# Patient Record
Sex: MALE | Race: WHITE | NOT HISPANIC OR LATINO | Employment: FULL TIME | URBAN - METROPOLITAN AREA
[De-identification: names, ages, dates, MRNs, and addresses within clinical notes are randomized per-mention and may not be internally consistent; named-entity substitution may affect disease eponyms.]

---

## 2019-10-02 ENCOUNTER — TELEPHONE (OUTPATIENT)
Dept: FAMILY MEDICINE | Facility: CLINIC | Age: 44
End: 2019-10-02

## 2019-10-02 NOTE — TELEPHONE ENCOUNTER
----- Message from Nita Sena sent at 10/2/2019  3:14 PM CDT -----  Contact: wife Thuy [phone]  Patient's spouse states she is returning your call. Please advise.

## 2019-11-01 ENCOUNTER — OFFICE VISIT (OUTPATIENT)
Dept: PRIMARY CARE CLINIC | Facility: CLINIC | Age: 44
End: 2019-11-01
Payer: COMMERCIAL

## 2019-11-01 VITALS
WEIGHT: 273.13 LBS | DIASTOLIC BLOOD PRESSURE: 80 MMHG | SYSTOLIC BLOOD PRESSURE: 120 MMHG | RESPIRATION RATE: 15 BRPM | BODY MASS INDEX: 36.2 KG/M2 | TEMPERATURE: 99 F | HEART RATE: 74 BPM | HEIGHT: 73 IN | OXYGEN SATURATION: 96 %

## 2019-11-01 DIAGNOSIS — Z23 NEED FOR VACCINATION: ICD-10-CM

## 2019-11-01 DIAGNOSIS — J30.9 ALLERGIC RHINITIS, UNSPECIFIED SEASONALITY, UNSPECIFIED TRIGGER: ICD-10-CM

## 2019-11-01 DIAGNOSIS — F33.0 MILD RECURRENT MAJOR DEPRESSION: ICD-10-CM

## 2019-11-01 DIAGNOSIS — E29.1 HYPOGONADISM MALE: ICD-10-CM

## 2019-11-01 DIAGNOSIS — Z00.00 ANNUAL PHYSICAL EXAM: Primary | ICD-10-CM

## 2019-11-01 PROCEDURE — 90471 TDAP VACCINE GREATER THAN OR EQUAL TO 7YO IM: ICD-10-PCS | Mod: S$GLB,,, | Performed by: INTERNAL MEDICINE

## 2019-11-01 PROCEDURE — 90715 TDAP VACCINE 7 YRS/> IM: CPT | Mod: S$GLB,,, | Performed by: INTERNAL MEDICINE

## 2019-11-01 PROCEDURE — 90715 TDAP VACCINE GREATER THAN OR EQUAL TO 7YO IM: ICD-10-PCS | Mod: S$GLB,,, | Performed by: INTERNAL MEDICINE

## 2019-11-01 PROCEDURE — 99999 PR PBB SHADOW E&M-EST. PATIENT-LVL IV: ICD-10-PCS | Mod: PBBFAC,,, | Performed by: INTERNAL MEDICINE

## 2019-11-01 PROCEDURE — 99999 PR PBB SHADOW E&M-EST. PATIENT-LVL IV: CPT | Mod: PBBFAC,,, | Performed by: INTERNAL MEDICINE

## 2019-11-01 PROCEDURE — 99386 PREV VISIT NEW AGE 40-64: CPT | Mod: 25,S$GLB,, | Performed by: INTERNAL MEDICINE

## 2019-11-01 PROCEDURE — 99386 PR PREVENTIVE VISIT,NEW,40-64: ICD-10-PCS | Mod: 25,S$GLB,, | Performed by: INTERNAL MEDICINE

## 2019-11-01 PROCEDURE — 90471 IMMUNIZATION ADMIN: CPT | Mod: S$GLB,,, | Performed by: INTERNAL MEDICINE

## 2019-11-01 RX ORDER — BUPROPION HYDROCHLORIDE 300 MG/1
1 TABLET ORAL DAILY
Refills: 1 | COMMUNITY
Start: 2019-09-13 | End: 2020-11-12 | Stop reason: SDUPTHER

## 2019-11-01 RX ORDER — FLUOXETINE HYDROCHLORIDE 20 MG/1
1 CAPSULE ORAL DAILY
COMMUNITY
End: 2020-07-27 | Stop reason: SDUPTHER

## 2019-11-01 NOTE — PROGRESS NOTES
[facility] Note    Chief Complaint      Chief Complaint   Patient presents with    Establish Care       History of Present Illness      Jacinto Alexander is a 44 y.o. male with chronic conditions of hypogonadism, depression who presents today for: establish care and annual preventative visit.  No recent PCP.    Hypogonadism: previously on topical testosterone. Was seeing Dr. Schofield, urology at the time.   Depression: Sees Dr. Denise.  On prozac and wellbutrin.  No side effects.  Control is satisfactory.  Diet: Prepares own food mostly.  Needs to limit fatty foods and carbs.  Focusing on portion control.  Has been eating worse since quitting smoking.  Drinks plenty water.    Exercise: Stays active.  Would like to bike more. Currently 1x/week.    Denies drinking and driving, drinking more than 4 drinks on occasion, drug use.       Past Medical History:  History reviewed. No pertinent past medical history.    Past Surgical History:   has a past surgical history that includes Cholecystectomy and Vasectomy.    Family History:  family history includes Heart disease in his father and mother; Lung cancer in his father; Stroke in his father.     Social History:  Social History     Tobacco Use    Smoking status: Former Smoker     Years: 19.00     Types: Cigarettes    Smokeless tobacco: Never Used   Substance Use Topics    Alcohol use: Not on file    Drug use: Not on file       Review of Systems   Constitutional: Negative for chills, fever and malaise/fatigue.   HENT: Negative for hearing loss.    Eyes: Negative for discharge.   Respiratory: Negative for shortness of breath and wheezing.    Cardiovascular: Negative for chest pain and palpitations.   Gastrointestinal: Negative for blood in stool, constipation, diarrhea, nausea and vomiting.   Genitourinary: Negative for hematuria and urgency.   Musculoskeletal: Negative for neck pain.   Skin: Negative for rash.   Neurological: Negative for weakness and  "headaches.   Endo/Heme/Allergies: Negative for polydipsia.        Medications:  Outpatient Encounter Medications as of 11/1/2019   Medication Sig Dispense Refill    buPROPion (WELLBUTRIN XL) 300 MG 24 hr tablet Take 1 tablet by mouth once daily.  1    FLUoxetine 20 MG capsule Take 1 capsule by mouth once daily.       No facility-administered encounter medications on file as of 11/1/2019.        Allergies:  Review of patient's allergies indicates:  No Known Allergies    Health Maintenance:  There is no immunization history for the selected administration types on file for this patient.   Health Maintenance   Topic Date Due    Lipid Panel  1973    TETANUS VACCINE  02/12/1991      Flu shot declines.  TdAP due.  Shingles vaccine due age 60.  Pneumonia vaccine due age 65.  Cscope due age 50.      Physical Exam      Vital Signs  Temp: 99.4 °F (37.4 °C)  Temp src: Oral  Pulse: 74  Resp: 15  SpO2: 96 %  BP: 120/80  BP Location: Left arm  Patient Position: Sitting  Pain Score: 0-No pain  Height and Weight  Height: 6' 1" (185.4 cm)  Weight: 123.9 kg (273 lb 2.4 oz)  BSA (Calculated - sq m): 2.53 sq meters  BMI (Calculated): 36.1  Weight in (lb) to have BMI = 25: 189.1]    Physical Exam   Constitutional: He appears well-developed and well-nourished.   HENT:   Head: Normocephalic and atraumatic.   Right Ear: External ear normal.   Left Ear: External ear normal.   Mouth/Throat: Oropharynx is clear and moist.   Eyes: Pupils are equal, round, and reactive to light. Conjunctivae and EOM are normal.   Cardiovascular: Normal rate, regular rhythm, normal heart sounds and intact distal pulses.   No murmur heard.  Pulmonary/Chest: Effort normal and breath sounds normal. He has no wheezes. He has no rales.   Abdominal: Soft. Bowel sounds are normal. He exhibits no distension and no abdominal bruit. There is no hepatosplenomegaly. There is no tenderness.   Vitals reviewed.       Laboratory:  CBC:      CMP:        Invalid " input(s): CREATININ  URINALYSIS:       LIPIDS:      TSH:      A1C:        Assessment/Plan     Jacinto Alexander is a 44 y.o.male with:    1. Annual physical exam  - CBC auto differential; Future  - Comprehensive metabolic panel; Future  - Lipid panel; Future  - TSH; Future  - T4, free; Future  - PSA, Screening; Future  - Testosterone, Total, Males; Future  - CBC auto differential  - Comprehensive metabolic panel  - Lipid panel  - TSH  - T4, free  - PSA, Screening  - Testosterone, Total, Males  Discussed diet and exercise, vaccines and cancer screening, risk factors.  Screening labs ordered.     2. Mild recurrent major depression  Continue current meds.  F/u with Dr. Denise   3. Hypogonadism male  - Testosterone, Total, Males; Future  - Testosterone, Total, Males  Will update labs and consider referral to endocrinology.    4. Need for vaccination  - Tdap Vaccine       Chronic conditions status updated as per HPI.  Other than changes above, cont current medications and maintain follow up with specialists.  Return to clinic in 12 months.    Ari Alanis MD  [facility]

## 2020-02-10 ENCOUNTER — TELEPHONE (OUTPATIENT)
Dept: FAMILY MEDICINE | Facility: CLINIC | Age: 45
End: 2020-02-10

## 2020-02-10 NOTE — TELEPHONE ENCOUNTER
----- Message from Parker Zamora sent at 2/10/2020  2:22 PM CST -----  Contact: Thuy Alexander ( wife)   Thuy Alexander ( wife) requesting call back in regards to getting orders for labs.       Call back number is [phone].       Thanks

## 2020-02-25 LAB
ALBUMIN SERPL-MCNC: 4.4 G/DL (ref 3.6–5.1)
ALBUMIN/GLOB SERPL: 1.8 (CALC) (ref 1–2.5)
ALP SERPL-CCNC: 85 U/L (ref 36–130)
ALT SERPL-CCNC: 17 U/L (ref 9–46)
AST SERPL-CCNC: 19 U/L (ref 10–40)
BASOPHILS # BLD AUTO: 79 CELLS/UL (ref 0–200)
BASOPHILS NFR BLD AUTO: 1.1 %
BILIRUB SERPL-MCNC: 1 MG/DL (ref 0.2–1.2)
BUN SERPL-MCNC: 11 MG/DL (ref 7–25)
BUN/CREAT SERPL: 8 (CALC) (ref 6–22)
CALCIUM SERPL-MCNC: 9.3 MG/DL (ref 8.6–10.3)
CHLORIDE SERPL-SCNC: 103 MMOL/L (ref 98–110)
CHOLEST SERPL-MCNC: 204 MG/DL
CHOLEST/HDLC SERPL: 6.8 (CALC)
CO2 SERPL-SCNC: 26 MMOL/L (ref 20–32)
CREAT SERPL-MCNC: 1.39 MG/DL (ref 0.6–1.35)
EOSINOPHIL # BLD AUTO: 281 CELLS/UL (ref 15–500)
EOSINOPHIL NFR BLD AUTO: 3.9 %
ERYTHROCYTE [DISTWIDTH] IN BLOOD BY AUTOMATED COUNT: 13.2 % (ref 11–15)
GFRSERPLBLD MDRD-ARVRAT: 60 ML/MIN/1.73M2
GLOBULIN SER CALC-MCNC: 2.5 G/DL (CALC) (ref 1.9–3.7)
GLUCOSE SERPL-MCNC: 90 MG/DL (ref 65–99)
HCT VFR BLD AUTO: 46.9 % (ref 38.5–50)
HDLC SERPL-MCNC: 30 MG/DL
HGB BLD-MCNC: 15.7 G/DL (ref 13.2–17.1)
LDLC SERPL CALC-MCNC: 132 MG/DL (CALC)
LYMPHOCYTES # BLD AUTO: 2981 CELLS/UL (ref 850–3900)
LYMPHOCYTES NFR BLD AUTO: 41.4 %
MCH RBC QN AUTO: 28.8 PG (ref 27–33)
MCHC RBC AUTO-ENTMCNC: 33.5 G/DL (ref 32–36)
MCV RBC AUTO: 86.1 FL (ref 80–100)
MONOCYTES # BLD AUTO: 461 CELLS/UL (ref 200–950)
MONOCYTES NFR BLD AUTO: 6.4 %
NEUTROPHILS # BLD AUTO: 3398 CELLS/UL (ref 1500–7800)
NEUTROPHILS NFR BLD AUTO: 47.2 %
NONHDLC SERPL-MCNC: 174 MG/DL (CALC)
PLATELET # BLD AUTO: 237 THOUSAND/UL (ref 140–400)
PMV BLD REES-ECKER: 9.5 FL (ref 7.5–12.5)
POTASSIUM SERPL-SCNC: 4 MMOL/L (ref 3.5–5.3)
PROT SERPL-MCNC: 6.9 G/DL (ref 6.1–8.1)
PSA SERPL-MCNC: 0.5 NG/ML
RBC # BLD AUTO: 5.45 MILLION/UL (ref 4.2–5.8)
SODIUM SERPL-SCNC: 140 MMOL/L (ref 135–146)
T4 FREE SERPL-MCNC: 1 NG/DL (ref 0.8–1.8)
TESTOST SERPL-MCNC: 210 NG/DL (ref 250–827)
TRIGL SERPL-MCNC: 275 MG/DL
TSH SERPL-ACNC: 3.06 MIU/L (ref 0.4–4.5)
WBC # BLD AUTO: 7.2 THOUSAND/UL (ref 3.8–10.8)

## 2020-03-02 DIAGNOSIS — E29.1 HYPOGONADISM MALE: Primary | ICD-10-CM

## 2020-03-02 NOTE — PROGRESS NOTES
Labs show borderline low testosterone.  Will send to endocrinology to see if restarting testosterone supplement is indicated.  Otherwise cholesterol slightly high.  Focus on low fat diet and exercise to keep controlled.  Other labs ok.

## 2020-03-10 ENCOUNTER — OFFICE VISIT (OUTPATIENT)
Dept: ENDOCRINOLOGY | Facility: CLINIC | Age: 45
End: 2020-03-10
Payer: COMMERCIAL

## 2020-03-10 VITALS
BODY MASS INDEX: 35.68 KG/M2 | WEIGHT: 269.19 LBS | DIASTOLIC BLOOD PRESSURE: 88 MMHG | HEART RATE: 88 BPM | HEIGHT: 73 IN | SYSTOLIC BLOOD PRESSURE: 116 MMHG

## 2020-03-10 DIAGNOSIS — E66.9 OBESITY (BMI 30.0-34.9): ICD-10-CM

## 2020-03-10 DIAGNOSIS — R06.83 HABITUAL SNORING: ICD-10-CM

## 2020-03-10 DIAGNOSIS — E29.1 HYPOGONADISM MALE: Primary | ICD-10-CM

## 2020-03-10 PROBLEM — E66.811 OBESITY (BMI 30.0-34.9): Status: ACTIVE | Noted: 2020-03-10

## 2020-03-10 PROCEDURE — 3008F PR BODY MASS INDEX (BMI) DOCUMENTED: ICD-10-PCS | Mod: CPTII,S$GLB,, | Performed by: STUDENT IN AN ORGANIZED HEALTH CARE EDUCATION/TRAINING PROGRAM

## 2020-03-10 PROCEDURE — 99204 OFFICE O/P NEW MOD 45 MIN: CPT | Mod: S$GLB,,, | Performed by: STUDENT IN AN ORGANIZED HEALTH CARE EDUCATION/TRAINING PROGRAM

## 2020-03-10 PROCEDURE — 3008F BODY MASS INDEX DOCD: CPT | Mod: CPTII,S$GLB,, | Performed by: STUDENT IN AN ORGANIZED HEALTH CARE EDUCATION/TRAINING PROGRAM

## 2020-03-10 PROCEDURE — 99204 PR OFFICE/OUTPT VISIT, NEW, LEVL IV, 45-59 MIN: ICD-10-PCS | Mod: S$GLB,,, | Performed by: STUDENT IN AN ORGANIZED HEALTH CARE EDUCATION/TRAINING PROGRAM

## 2020-03-10 PROCEDURE — 99999 PR PBB SHADOW E&M-EST. PATIENT-LVL IV: ICD-10-PCS | Mod: PBBFAC,,, | Performed by: STUDENT IN AN ORGANIZED HEALTH CARE EDUCATION/TRAINING PROGRAM

## 2020-03-10 PROCEDURE — 99999 PR PBB SHADOW E&M-EST. PATIENT-LVL IV: CPT | Mod: PBBFAC,,, | Performed by: STUDENT IN AN ORGANIZED HEALTH CARE EDUCATION/TRAINING PROGRAM

## 2020-03-10 NOTE — PROGRESS NOTES
"Subjective:      Patient ID: Jacinto Alexander is a 45 y.o. male.    Chief Complaint:  Hypogonadism      History of Present Illness  This is a 45 y.o. male. with a past medical history of depression and hypogonadism.    With regards to hypogonadism  Diagnosed in 2014 when he was experiencing low energy  Reported low testosterone at that time but unsure of other labs  Treatment: TRT gel, was on it for a very short period as he found it annoying to apply  Risks: Obesity, probable sleep apnea  No opioid use, no other supplements or drugs   He reports he snores but has not been evaluated for LANEY    No problems with fertiility, has 2 children, had vasectomy in 2014    Since 2014 has had continued low energy, low libido at times, normal erections  Able to engage in and complete intercourse   No medications or herbal products a/w hypogonadism or gynecomastia.  Pt denies taking performance-enhancing drugs.  Reports less muscle mass, more fat with inability to exercise as abefore    Normal development.   Puberty onset at age      No h/o adrenal tumors.  Pt reports no abdominal pain.     No h/o liver disease  Noted creatinine of 1.3, GFR of 60  No h/o hyperthyroidism        Review of Systems   Constitutional: Positive for fatigue. Negative for unexpected weight change.   Eyes: Negative for visual disturbance.   Respiratory: Negative for shortness of breath.    Cardiovascular: Negative for chest pain.   Gastrointestinal: Negative for abdominal pain.   Genitourinary: Negative for testicular pain.   Musculoskeletal: Negative for arthralgias.   Skin: Negative for wound.   Allergic/Immunologic: Negative for food allergies.   Neurological: Negative for headaches.   Hematological: Does not bruise/bleed easily.       Social and family history reviewed  Current medications and allergies reviewed    Objective:   /88   Pulse 88   Ht 6' 1" (1.854 m)   Wt 122.1 kg (269 lb 2.9 oz)   BMI 35.51 kg/m²   Physical Exam   Constitutional: " He appears well-developed.   HENT:   Right Ear: External ear normal.   Left Ear: External ear normal.   Nose: Nose normal.   Neck: No tracheal deviation present. No thyromegaly present.   Cardiovascular: Normal rate, regular rhythm and normal heart sounds.   No murmur heard.  Pulmonary/Chest: Effort normal and breath sounds normal. No respiratory distress.   Abdominal: Soft. He exhibits no mass. No hernia.   Genitourinary: Penis normal.   Genitourinary Comments: Testicular size ~20cc bilaterally, no tenderness, no masses, no varicocele   Musculoskeletal: He exhibits no edema.   Neurological: He is alert. No cranial nerve deficit or sensory deficit. Coordination normal.   Skin: No rash noted.   Psychiatric: He has a normal mood and affect. Judgment normal.   Vitals reviewed.   No gynecomastia  BP Readings from Last 1 Encounters:   03/10/20 116/88      Wt Readings from Last 1 Encounters:   03/10/20 0754 122.1 kg (269 lb 2.9 oz)     Body mass index is 35.51 kg/m².    Lab Review:   No results found for: HGBA1C  Lab Results   Component Value Date    CHOL 204 (H) 02/24/2020    HDL 30 (L) 02/24/2020    LDLCALC 132 (H) 02/24/2020    TRIG 275 (H) 02/24/2020    CHOLHDL 6.8 (H) 02/24/2020     Lab Results   Component Value Date     02/24/2020    K 4.0 02/24/2020     02/24/2020    CO2 26 02/24/2020    GLU 90 02/24/2020    BUN 11 02/24/2020    CREATININE 1.39 (H) 02/24/2020    CALCIUM 9.3 02/24/2020    PROT 6.9 02/24/2020    ALBUMIN 4.4 02/24/2020    BILITOT 1.0 02/24/2020    ALKPHOS 85 02/24/2020    AST 19 02/24/2020    ALT 17 02/24/2020    ESTGFRAFRICA 69 02/24/2020    EGFRNONAA 60 02/24/2020    TSH 3.06 02/24/2020       All pertinent labs reviewed    Assessment and Plan     Hypogonadism male  Risk factors: obesity, probable sleep apnea  Effects of obesity on androgen levels discussed with patient, encouraged on weight loss    Will repeat testosterone panel with FSH, LH, prolactin  Will check other pituitary  hormones  Will check A1c  Referral to sleep clinic    Reviewed recent data on risks and benefits of testosterone therapy.  Studies show it may increase the risk of CAD and death and that its use should be targeting QOL.     If confirmed hypogonadism will give trial of SQ testosterone after ensuring LANEY has been ruled out or diagnosed and treated given risk for worsening of LANEY with TRT.    If patient sees response to SQ TRT will send Urology referral for pellets         Habitual snoring  Sleep Clinic referral given high risk STOPBANG score along with hypogonadism    Obesity (BMI 30.0-34.9)  Effects of obesity on androgen levels discussed with patient, encouraged on weight loss  Will screen for diabetes    Miguel Angel Gambino MD  Endocrinology Fellow    I, Maura Garcia MD,  have personally taken the history and examined the patient and agree with the resident's note as stated above.

## 2020-03-10 NOTE — ASSESSMENT & PLAN NOTE
Effects of obesity on androgen levels discussed with patient, encouraged on weight loss  Will screen for diabetes

## 2020-03-10 NOTE — ASSESSMENT & PLAN NOTE
Risk factors: obesity, probable sleep apnea  Effects of obesity on androgen levels discussed with patient, encouraged on weight loss    Will repeat testosterone panel with FSH, LH, prolactin  Will check other pituitary hormones  Will check A1c  Referral to sleep clinic    Reviewed recent data on risks and benefits of testosterone therapy.  Studies show it may increase the risk of CAD and death and that its use should be targeting QOL.     If confirmed hypogonadism will give trial of SQ testosterone after ensuring LANEY has been ruled out or diagnosed and treated given risk for worsening of LANEY with TRT.    If patient sees response to SQ TRT will send Urology referral for pellets

## 2020-03-16 ENCOUNTER — OFFICE VISIT (OUTPATIENT)
Dept: SLEEP MEDICINE | Facility: CLINIC | Age: 45
End: 2020-03-16
Payer: COMMERCIAL

## 2020-03-16 ENCOUNTER — PATIENT MESSAGE (OUTPATIENT)
Dept: ENDOCRINOLOGY | Facility: HOSPITAL | Age: 45
End: 2020-03-16

## 2020-03-16 VITALS
BODY MASS INDEX: 35.65 KG/M2 | TEMPERATURE: 98 F | WEIGHT: 269 LBS | DIASTOLIC BLOOD PRESSURE: 81 MMHG | HEIGHT: 73 IN | SYSTOLIC BLOOD PRESSURE: 122 MMHG | HEART RATE: 68 BPM

## 2020-03-16 DIAGNOSIS — R06.83 HABITUAL SNORING: ICD-10-CM

## 2020-03-16 DIAGNOSIS — G47.30 SLEEP APNEA, UNSPECIFIED TYPE: Primary | ICD-10-CM

## 2020-03-16 DIAGNOSIS — E29.1 HYPOGONADISM MALE: Primary | ICD-10-CM

## 2020-03-16 PROCEDURE — 3008F BODY MASS INDEX DOCD: CPT | Mod: CPTII,S$GLB,, | Performed by: PSYCHIATRY & NEUROLOGY

## 2020-03-16 PROCEDURE — 99203 PR OFFICE/OUTPT VISIT, NEW, LEVL III, 30-44 MIN: ICD-10-PCS | Mod: S$GLB,,, | Performed by: PSYCHIATRY & NEUROLOGY

## 2020-03-16 PROCEDURE — 99999 PR PBB SHADOW E&M-EST. PATIENT-LVL IV: ICD-10-PCS | Mod: PBBFAC,,, | Performed by: PSYCHIATRY & NEUROLOGY

## 2020-03-16 PROCEDURE — 99203 OFFICE O/P NEW LOW 30 MIN: CPT | Mod: S$GLB,,, | Performed by: PSYCHIATRY & NEUROLOGY

## 2020-03-16 PROCEDURE — 99999 PR PBB SHADOW E&M-EST. PATIENT-LVL IV: CPT | Mod: PBBFAC,,, | Performed by: PSYCHIATRY & NEUROLOGY

## 2020-03-16 PROCEDURE — 3008F PR BODY MASS INDEX (BMI) DOCUMENTED: ICD-10-PCS | Mod: CPTII,S$GLB,, | Performed by: PSYCHIATRY & NEUROLOGY

## 2020-03-16 NOTE — PATIENT INSTRUCTIONS
SLEEP LAB ([facility] or [facility]) will contact you to schedulethe sleep study. Their number is [phone] (ext 2). Please call them if you do not hear from them in 2 weeks from now.  The [facility] is located on 7th floor of the [facility]; [facility] is located in [facility].    SLEEP CLINIC (my assistant) will call you when the sleep study results are ready - if you have not heard from us by 2 weeks from the date of the study, please call [phone] (ext 1) or you can use My [facility] to contact me.    You are advised to abstain from driving should you feel sleepy or drowsy.

## 2020-03-16 NOTE — PROGRESS NOTES
Jacinto Alexander  was seen at the request of  Miguel Angel Ordaz for sleep evaluation.    03/16/2020 INITIAL HISTORY OF PRESENT ILLNESS:  Jacinto Alexander is a 45 y.o. male is here to be evaluated for a sleep disorder.       CHIEF COMPLAINT:      The patient's complaints include excessive daytime fatigue, snoring and interrupted sleep since  2 years.  He was found to have low testosterone.    Jacinto Alexander denied  excessive daytime sleepiness,  witnessed breathing pauses and  gasping for air in sleep, however can doze off after work.    The patient never had tonsillectomy, adenoidectomy or UPPP     Denied headache and sore throat on awakening.    Denies difficulty falling and staying asleep.     Denies symptoms concerning for parasomnia except for occasional somniloquy.  he Denies cataplexy, sleep paralysis, hallucinations surrounding sleep time.     Jacinto Alexander denied symptoms concerning for RLS.      EPWORTH SLEEPINESS SCALE 3/15/2020   Sitting and reading 1   Watching TV 0   Sitting, inactive in a public place (e.g. a theatre or a meeting) 0   As a passenger in a car for an hour without a break 0   Lying down to rest in the afternoon when circumstances permit 2   Sitting and talking to someone 0   Sitting quietly after a lunch without alcohol 0   In a car, while stopped for a few minutes in traffic 0   Total score 3       PHQ9 3/15/2020   Little interest or pleasure in doing things: Not at all   Feeling down, depressed or hopeless: Not at all   Trouble falling asleep, staying asleep, or sleeping too much: Several days   Feeling tired or having little energy: Several days   Poor appetite or overeating: Several days   Feeling bad about yourself- or that you are a failure or have let yourself or family down Not at all   Trouble concentrating on things, such as reading the newspaper or watching television: Several days   Moving or speaking so slowly that other people could have noticed. Or the opposite-  being so fidgety or restless that you have been moving around a lot more than usual: Not at all   Thoughts that you would be better off dead or hurting yourself in some way: Not at all   If you indicated you have experienced any of the aforementioned problems, how difficult have these problems made it for you to do your work, take care of things at home or get along with other people? Not difficult at all   Total Score 4     GAD7 3/15/2020   1. Feeling nervous, anxious, or on edge? 1   2. Not being able to stop or control worrying? 0   3. Worrying too much about different things? 1   4. Trouble relaxing? 0   5. Being so restless that it is hard to sit still? 0   6. Becoming easily annoyed or irritable? 2   7. Feeling afraid as if something awful might happen? 0   8. If you checked off any problems, how difficult have these problems made it for you to do your work, take care of things at home, or get along with other people? 1   RE-7 Score 4         SLEEP ROUTINE AND LIFESTYLE 03/16/2020 :    Sleep Clinic New Patient 3/15/2020   What time do you go to bed on a week day? (Give a range) Between 9:00 and 10:00PM   What time do you go to bed on a day off? (Give a range) Between 10:00PM and midnight   How long does it take you to fall asleep? (Give a range) 30 to 60 minutes   On average, how many times per night do you wake up? 1-2   How long does it take you to fall back into sleep? (Give a range) A couple of minutes.   What time do you wake up to start your day on a week day? (Give a range) 5:20 - 5:30   What time do you wake up to start your day on a day off? (Give a range) Between 7:00 and 9:00am   What time do you get out of bed? (Give a range) 5:30 - 8:30am   On average, how many hours do you sleep? 7.5 hours.   On average, how many naps do you take per day? 0   Rate your sleep quality from 0 to 5 (0-poor, 5-great). 4   Have you experienced:  Weight gain?   How much weight have you lost or gained (in lbs.) in the  last year? 10   On average, how many times per night do you go to the bathroom?  0   Have you ever had a sleep study/CPAP machine/surgery for sleep apnea? No   Have you ever had a CPAP machine for sleep apnea? No   Have you ever had surgery for sleep apnea? No       Sleep Clinic ROS  3/15/2020   Difficulty breathing through the nose?  Sometimes   Sore throat or dry mouth in the morning? No   Irregular or very fast heart beat?  No   Shortness of breath?  No   Acid reflux? No   Body aches and pains?  No   Morning headaches? No   Dizziness? No   Mood changes?  Sometimes   Do you exercise?  Sometimes   Do you feel like moving your legs a lot?  Sometimes            PREVIOUS SLEEP STUDIES:     no      Social History:  Occupation:no  Bed partner: +  Exercise routine: no motivativating  Caffeine:  A lot - cokes cokes through 8 PM  Alcohol: occasionally weekends  Smoking:quit in 2012    DME:       PAST MEDICAL HISTORY:    Active Ambulatory Problems     Diagnosis Date Noted    Mild recurrent major depression 11/01/2019    Hypogonadism male 11/01/2019    Allergic rhinitis 11/01/2019    Obesity (BMI 30.0-34.9) 03/10/2020    Habitual snoring 03/10/2020     Resolved Ambulatory Problems     Diagnosis Date Noted    No Resolved Ambulatory Problems     No Additional Past Medical History                PAST SURGICAL HISTORY:    Past Surgical History:   Procedure Laterality Date    CHOLECYSTECTOMY      VASECTOMY           FAMILY HISTORY:                Family History   Problem Relation Age of Onset    Heart disease Mother     Heart disease Father     Lung cancer Father     Stroke Father        SOCIAL HISTORY:          Tobacco:   Social History     Tobacco Use   Smoking Status Former Smoker    Years: 19.00    Types: Cigarettes   Smokeless Tobacco Never Used       alcohol use:    Social History     Substance and Sexual Activity   Alcohol Use Yes    Comment: occasional                   ALLERGIES:  Review of patient's  "allergies indicates:  No Known Allergies    CURRENT MEDICATIONS:    Current Outpatient Medications   Medication Sig Dispense Refill    buPROPion (WELLBUTRIN XL) 300 MG 24 hr tablet Take 1 tablet by mouth once daily.  1    FLUoxetine 20 MG capsule Take 1 capsule by mouth once daily.       No current facility-administered medications for this visit.                         PHYSICAL EXAM:  /81 (BP Location: Right arm, Patient Position: Sitting, BP Method: Large (Automatic))   Pulse 68   Temp 97.9 °F (36.6 °C)   Ht 6' 1" (1.854 m)   Wt 122 kg (269 lb)   BMI 35.49 kg/m²   GENERAL: Normal development, well groomed.  HEENT:   HEENT:  Conjunctivae are non-erythematous; Pupils equal, round, and reactive to light; Nose is symmetrical; Nasal mucosa is pink and moist; Septum is midline; Inferior turbinates are normal; Nasal airflow is diminshed; Posterior pharynx is pink; Modified Mallampati:III-IV; Posterior palate is low; Tonsils +3; Uvula is wide and elongated;Tongue is enlarged; Dentition is fair; No TMJ tenderness; Jaw opening and protrusion without click and without discomfort.  NECK: Supple. Neck circumference is 30 inches. No thyromegaly. No palpable nodes.     SKIN: On face and neck: No abrasions, no rashes, no lesions.  No subcutaneous nodules are palpable.  RESPIRATORY: Chest is clear to auscultation.  Normal chest expansion and non-labored breathing at rest.  CARDIOVASCULAR: Normal S1, S2.  No murmurs, gallops or rubs. No carotid bruits bilaterally.  No edema. No clubbing. No cyanosis.    NEURO: Oriented to time, place and person. Normal attention span and concentration. Gait normal.    PSYCH: Affect is full. Mood is normal  MUSCULOSKELETAL: Moves 4 extremities. Gait normal.         Using [facility]: yes      ASSESSMENT:    1. Sleep Apnea NEC. The patient symptomatically has  snoring, excessive daytime fatigue and interrupted sleep  with exam findings of "a crowded oral airway and elevated body mass " index. The patient has medical co-morbidities of hypogonadism,  which can be worsened by LANEY. This warrants further investigation for possible obstructive sleep apnea.          PLAN:    Diagnostic: HST. The nature of this procedure and its indication was discussed with the patient. he would  like to come discuss PSG results.    Weight loss strategies were discussed in detail         More than 15 minutes of this 30 minutes visit was spent in counseling: and coordination of care.     during our discussion today, we talked about the etiology of  LANEY as well as the potential ramifications of untreated sleep apnea, which could include daytime sleepiness, hypertension, heart disease and/or stroke.  We discussed potential treatment options, which could include weight loss, body positioning, continuous positive airway pressure (CPAP), or referral for surgical consideration. Meanwhile, he  is urged to avoid supine sleep, weight gain and alcoholic beverages since all of these can worsen LANEY.     Precautions: The patient was advised to abstain from driving should he feel sleepy or drowsy.    Follow up: MD/NP  after the sleep study has been completed.     Thank you for allowing me the opportunity to participate in the care of your patient.    This visit summary will be sent to referring provider via inbasket

## 2020-04-08 ENCOUNTER — TELEPHONE (OUTPATIENT)
Dept: SLEEP MEDICINE | Facility: OTHER | Age: 45
End: 2020-04-08

## 2020-04-14 ENCOUNTER — TELEPHONE (OUTPATIENT)
Dept: SLEEP MEDICINE | Facility: OTHER | Age: 45
End: 2020-04-14

## 2020-04-28 ENCOUNTER — HOSPITAL ENCOUNTER (OUTPATIENT)
Dept: SLEEP MEDICINE | Facility: OTHER | Age: 45
Discharge: HOME | End: 2020-04-28
Attending: PSYCHIATRY & NEUROLOGY
Payer: COMMERCIAL

## 2020-04-28 DIAGNOSIS — G47.33 OSA (OBSTRUCTIVE SLEEP APNEA): ICD-10-CM

## 2020-04-28 DIAGNOSIS — G47.30 SLEEP APNEA, UNSPECIFIED TYPE: ICD-10-CM

## 2020-04-28 PROCEDURE — 95800 SLP STDY UNATTENDED: CPT

## 2020-04-28 PROCEDURE — 95800 SLP STDY UNATTENDED: CPT | Mod: 26,,, | Performed by: PSYCHIATRY & NEUROLOGY

## 2020-04-28 PROCEDURE — 95800 PR SLEEP STUDY, UNATTENDED, RECORD HEART RATE/O2 SAT/RESP ANAL/SLEEP TIME: ICD-10-PCS | Mod: 26,,, | Performed by: PSYCHIATRY & NEUROLOGY

## 2020-04-28 NOTE — PROGRESS NOTES
Per physician orders, patient was given home sleep testing device and instructed on how to apply the device before going to bed tonight.  I sized the device and showed the patient using a mirror how the device fits and what it should look like so they can use a mirror when putting it on themselves at home.  We reviewed the instruction booklet and the number to call if they have any questions at night.  Patient understood and was instructed to return the device the next day back to the sleep lab.

## 2020-05-08 ENCOUNTER — TELEPHONE (OUTPATIENT)
Dept: SLEEP MEDICINE | Facility: CLINIC | Age: 45
End: 2020-05-08

## 2020-05-08 ENCOUNTER — PATIENT MESSAGE (OUTPATIENT)
Dept: SLEEP MEDICINE | Facility: CLINIC | Age: 45
End: 2020-05-08

## 2020-05-08 NOTE — PROCEDURES
PHYSICIAN INTERPRETATION AND COMMENTS: Findings are consistent with mild, obstructive sleep apnea (LANEY)  (G47.33), by overall AHI (apnea hypopnea index). However, findings on this study suggest that the degree of sleep disordered  breathing is in the moderate severity range, when RDI is measured. This study was technically adequate to allow for interpretation.  CLINICAL HISTORY: 45 year old male presented with: 18.5 inch neck, BMI of 35, an [place] sleepiness score of 4, no comorbidities  and symptoms of nocturnal snoring. Based on the clinical history, the patient has a high pre-test probability of having  severe LANEY.  SLEEP STUDY FINDINGS: Patient underwent a one night Home Sleep Test and by behavioral criteria, slept for approximately  5.4 hours, with a sleep latency of 64 minutes and a sleep efficiency of 77.1%. Mild sleep disordered breathing (AHI=9) is noted  based on a 4% hypopnea desaturation criteria, predominantly in the supine position (26 events/hour). The patient slept supine 24.8%  of the night based on valid recording time of 5.4 hours and is 8.7 times as likely to have apneas/hypopneas when supine. When  considering more subtle measures of sleep disordered breathing, the overall respiratory disturbance index is moderate(RDI=16)  based on a 1% hypopnea desaturation criteria with confirmation by surrogate arousal indicators. The apneas/hypopneas are  accompanied by minimal oxygen desaturation (percent time below 90% SpO2: 0.2%, Min SpO2: 88.6%). The average desaturation  across all sleep disordered breathing events is 2.8%. Snoring occurs for 18.1% (30 dB) of the study, 14.9% is very loud. The mean  pulse rate is 66 BPM, with infrequent pulse rate variability (30 events with >= 6 BPM increase/decrease per hour).  TREATMENT CONSIDERATIONS: Consider trial of Auto-titrating CPAP 6-20 cm, mask of patient's choice, and heated  humidification. If patient has difficulty with CPAP adherence or ongoing LANEY  "symptoms or despite CPAP adherence, then consider  an in-lab titration sleep study in order to determine optimal fixed CPAP setting. Alternatively consider oral appliance fitted by a  dentist specializing in these devices, or surgical consultation for uvulopalatopharyngoplasty (UPPP) for treatment of obstructive sleep  apnea.  DISEASE MANAGEMENT CONSIDERATIONS: Definitive treatment for LANEY is recommended. Consider Sleep Clinic  referral for LANEY management.  Signature: ELAINE Salas MD  ______________________________-----    See imported Detailed Sleep Study Reports with raw data in  "Chart Review" under the "Media tab".      (This Sleep Study was interpreted by a Board Certified Sleep Specialist who conducted an epoch-by-epoch review of the entire raw data recording.)     (The indication for this sleep study was reviewed and deemed appropriate by [facility] Practice Parameters or other reasons by a Board Certified Sleep Specialist.)    "

## 2020-05-08 NOTE — TELEPHONE ENCOUNTER
CB,    Can we still schedule a virtual visit for me to twll him about sleep study results and treatment?  Mild to moderate LANEY    I will also send him MO message    TY

## 2020-05-19 ENCOUNTER — OFFICE VISIT (OUTPATIENT)
Dept: SLEEP MEDICINE | Facility: CLINIC | Age: 45
End: 2020-05-19
Payer: COMMERCIAL

## 2020-05-19 DIAGNOSIS — R09.81 SINUS CONGESTION: Primary | ICD-10-CM

## 2020-05-19 DIAGNOSIS — G47.33 OSA (OBSTRUCTIVE SLEEP APNEA): ICD-10-CM

## 2020-05-19 PROCEDURE — 99214 OFFICE O/P EST MOD 30 MIN: CPT | Mod: 95,,, | Performed by: PSYCHIATRY & NEUROLOGY

## 2020-05-19 PROCEDURE — 99214 PR OFFICE/OUTPT VISIT, EST, LEVL IV, 30-39 MIN: ICD-10-PCS | Mod: 95,,, | Performed by: PSYCHIATRY & NEUROLOGY

## 2020-05-19 NOTE — PATIENT INSTRUCTIONS
[facility] Contact information    [address]INNIE [address] [place]    [phone]    [facility]      COMFORT Real, Dona    [phone]    [facility]      VINNIE Bran., [place]    [phone]    [facility]      VINNIE Allen., [place]    [phone]    [facility]      MD Pati, Phi    [phone]    [facility]      VINNIE Tapia., [place]    [phone]    [facility]      VINNIE Pisano., [place]    [phone]    [facility]      COMFORT Coles, Farrah    [phone]    [facility]      VINNIE Valdes., Klaudia    [phone]    [facility]      ________________        I'm ordering  the machine for you through [facility]:  [phone]->1->2  They will call you within a week, or you can call them.  ________________________________    Aftter you get your machine:    1. Please keep in mind, that when you come to  the machine, you will be choosing the CPAP mask during that time.   Please call [phone]->1->2 within 30 days if you uncomfortable with your mask    2. Please let me know through [facility] if you are not comfortable with the pressure settings - I can help.    My medical assistant will call you to schedule the follow up.              Sincerely,    Dr. Salas

## 2020-05-19 NOTE — PROGRESS NOTES
Jacinto Alexander  was seen at the request of  No ref. provider found for sleep evaluation.    03/16/2020 INITIAL HISTORY OF PRESENT ILLNESS:  Jacinto Alexander is a 45 y.o. male is here to be evaluated for a sleep disorder.       CHIEF COMPLAINT:      The patient's complaints include excessive daytime fatigue, snoring and interrupted sleep since  2 years.  He was found to have low testosterone.    Jacinto Alexander denied  excessive daytime sleepiness,  witnessed breathing pauses and  gasping for air in sleep, however can doze off after work.    The patient never had tonsillectomy, adenoidectomy or UPPP     Denied headache and sore throat on awakening.    Denies difficulty falling and staying asleep.     Denies symptoms concerning for parasomnia except for occasional somniloquy.  he Denies cataplexy, sleep paralysis, hallucinations surrounding sleep time.     Jacinto Alexander denied symptoms concerning for RLS.      EPWORTH SLEEPINESS SCALE 3/15/2020   Sitting and reading 1   Watching TV 0   Sitting, inactive in a public place (e.g. a theatre or a meeting) 0   As a passenger in a car for an hour without a break 0   Lying down to rest in the afternoon when circumstances permit 2   Sitting and talking to someone 0   Sitting quietly after a lunch without alcohol 0   In a car, while stopped for a few minutes in traffic 0   Total score 3       PHQ9 3/15/2020   Little interest or pleasure in doing things: Not at all   Feeling down, depressed or hopeless: Not at all   Trouble falling asleep, staying asleep, or sleeping too much: Several days   Feeling tired or having little energy: Several days   Poor appetite or overeating: Several days   Feeling bad about yourself- or that you are a failure or have let yourself or family down Not at all   Trouble concentrating on things, such as reading the newspaper or watching television: Several days   Moving or speaking so slowly that other people could have noticed. Or the opposite-  being so fidgety or restless that you have been moving around a lot more than usual: Not at all   Thoughts that you would be better off dead or hurting yourself in some way: Not at all   If you indicated you have experienced any of the aforementioned problems, how difficult have these problems made it for you to do your work, take care of things at home or get along with other people? Not difficult at all   Total Score 4     GAD7 3/15/2020   1. Feeling nervous, anxious, or on edge? 1   2. Not being able to stop or control worrying? 0   3. Worrying too much about different things? 1   4. Trouble relaxing? 0   5. Being so restless that it is hard to sit still? 0   6. Becoming easily annoyed or irritable? 2   7. Feeling afraid as if something awful might happen? 0   8. If you checked off any problems, how difficult have these problems made it for you to do your work, take care of things at home, or get along with other people? 1   RE-7 Score 4         SLEEP ROUTINE AND LIFESTYLE 05/19/2020 :    Sleep Clinic New Patient 3/15/2020   What time do you go to bed on a week day? (Give a range) Between 9:00 and 10:00PM   What time do you go to bed on a day off? (Give a range) Between 10:00PM and midnight   How long does it take you to fall asleep? (Give a range) 30 to 60 minutes   On average, how many times per night do you wake up? 1-2   How long does it take you to fall back into sleep? (Give a range) A couple of minutes.   What time do you wake up to start your day on a week day? (Give a range) 5:20 - 5:30   What time do you wake up to start your day on a day off? (Give a range) Between 7:00 and 9:00am   What time do you get out of bed? (Give a range) 5:30 - 8:30am   On average, how many hours do you sleep? 7.5 hours.   On average, how many naps do you take per day? 0   Rate your sleep quality from 0 to 5 (0-poor, 5-great). 4   Have you experienced:  Weight gain?   How much weight have you lost or gained (in lbs.) in the  last year? 10   On average, how many times per night do you go to the bathroom?  0   Have you ever had a sleep study/CPAP machine/surgery for sleep apnea? No   Have you ever had a CPAP machine for sleep apnea? No   Have you ever had surgery for sleep apnea? No       Sleep Clinic ROS  3/15/2020   Difficulty breathing through the nose?  Sometimes   Sore throat or dry mouth in the morning? No   Irregular or very fast heart beat?  No   Shortness of breath?  No   Acid reflux? No   Body aches and pains?  No   Morning headaches? No   Dizziness? No   Mood changes?  Sometimes   Do you exercise?  Sometimes   Do you feel like moving your legs a lot?  Sometimes            PREVIOUS SLEEP STUDIES:     no      Social History:  Occupation:no  Bed partner: +  Exercise routine: no motivativating  Caffeine:  A lot - cokes cokes through 8 PM  Alcohol: occasionally weekends  Smoking:quit in 2012    DME:       PAST MEDICAL HISTORY:    Active Ambulatory Problems     Diagnosis Date Noted    Mild recurrent major depression 11/01/2019    Hypogonadism male 11/01/2019    Allergic rhinitis 11/01/2019    Obesity (BMI 30.0-34.9) 03/10/2020    Habitual snoring 03/10/2020    LANEY (obstructive sleep apnea)      Resolved Ambulatory Problems     Diagnosis Date Noted    No Resolved Ambulatory Problems     No Additional Past Medical History                PAST SURGICAL HISTORY:    Past Surgical History:   Procedure Laterality Date    CHOLECYSTECTOMY      VASECTOMY           FAMILY HISTORY:                Family History   Problem Relation Age of Onset    Heart disease Mother     Heart disease Father     Lung cancer Father     Stroke Father        SOCIAL HISTORY:          Tobacco:   Social History     Tobacco Use   Smoking Status Former Smoker    Years: 19.00    Types: Cigarettes   Smokeless Tobacco Never Used       alcohol use:    Social History     Substance and Sexual Activity   Alcohol Use Yes    Comment: occasional              "      ALLERGIES:  Review of patient's allergies indicates:  No Known Allergies    CURRENT MEDICATIONS:    Current Outpatient Medications   Medication Sig Dispense Refill    buPROPion (WELLBUTRIN XL) 300 MG 24 hr tablet Take 1 tablet by mouth once daily.  1    FLUoxetine 20 MG capsule Take 1 capsule by mouth once daily.       No current facility-administered medications for this visit.                         PHYSICAL EXAM:  There were no vitals taken for this visit.  GENERAL: Normal development, well groomed.  HEENT:   HEENT:  Conjunctivae are non-erythematous; Pupils equal, round, and reactive to light; Nose is symmetrical; Nasal mucosa is pink and moist; Septum is midline; Inferior turbinates are normal; Nasal airflow is diminshed; Posterior pharynx is pink; Modified Mallampati:III-IV; Posterior palate is low; Tonsils +3; Uvula is wide and elongated;Tongue is enlarged; Dentition is fair; No TMJ tenderness; Jaw opening and protrusion without click and without discomfort.  NECK: Supple. Neck circumference is 30 inches. No thyromegaly. No palpable nodes.     SKIN: On face and neck: No abrasions, no rashes, no lesions.  No subcutaneous nodules are palpable.  RESPIRATORY: Chest is clear to auscultation.  Normal chest expansion and non-labored breathing at rest.  CARDIOVASCULAR: Normal S1, S2.  No murmurs, gallops or rubs. No carotid bruits bilaterally.  No edema. No clubbing. No cyanosis.    NEURO: Oriented to time, place and person. Normal attention span and concentration. Gait normal.    PSYCH: Affect is full. Mood is normal  MUSCULOSKELETAL: Moves 4 extremities. Gait normal.         Using [facility]: yes      ASSESSMENT:    1. Sleep Apnea NEC. The patient symptomatically has  snoring, excessive daytime fatigue and interrupted sleep  with exam findings of "a crowded oral airway and elevated body mass index. The patient has medical co-morbidities of hypogonadism,  which can be worsened by LANEY. This warrants further " investigation for possible obstructive sleep apnea.          PLAN:    Diagnostic: HST. The nature of this procedure and its indication was discussed with the patient. he would  like to come discuss PSG results.    Weight loss strategies were discussed in detail         More than 15 minutes of this 30 minutes visit was spent in counseling: and coordination of care.     during our discussion today, we talked about the etiology of  LANEY as well as the potential ramifications of untreated sleep apnea, which could include daytime sleepiness, hypertension, heart disease and/or stroke.  We discussed potential treatment options, which could include weight loss, body positioning, continuous positive airway pressure (CPAP), or referral for surgical consideration. Meanwhile, he  is urged to avoid supine sleep, weight gain and alcoholic beverages since all of these can worsen LANEY.     Precautions: The patient was advised to abstain from driving should he feel sleepy or drowsy.    Follow up: MD/NP  after the sleep study has been completed.     Thank you for allowing me the opportunity to participate in the care of your patient.    This visit summary will be sent to referring provider via inbasket

## 2020-05-29 ENCOUNTER — PATIENT MESSAGE (OUTPATIENT)
Dept: SLEEP MEDICINE | Facility: CLINIC | Age: 45
End: 2020-05-29

## 2020-07-25 ENCOUNTER — PATIENT MESSAGE (OUTPATIENT)
Dept: PRIMARY CARE CLINIC | Facility: CLINIC | Age: 45
End: 2020-07-25

## 2020-07-27 RX ORDER — FLUOXETINE HYDROCHLORIDE 20 MG/1
20 CAPSULE ORAL DAILY
Qty: 90 CAPSULE | Refills: 3 | Status: SHIPPED | OUTPATIENT
Start: 2020-07-27 | End: 2020-12-17 | Stop reason: SDUPTHER

## 2020-10-13 ENCOUNTER — TELEPHONE (OUTPATIENT)
Dept: SLEEP MEDICINE | Facility: CLINIC | Age: 45
End: 2020-10-13

## 2020-10-13 NOTE — TELEPHONE ENCOUNTER
----- Message from Ruby Salas MD sent at 10/13/2020  9:00 AM CDT -----  CB,    Please schedule the follow up to discuss other treatment options.    TY!    Please see below.  ----- Message -----  From: Mya Batres RRT  Sent: 10/9/2020   4:02 PM CDT  To: Ruby Salas MD    Hey! Just wanted to let you know Mr. Cline turned in his machine today because he said he could not tolerate it and will never wear it. I advised him to reach out to you

## 2020-10-27 ENCOUNTER — TELEPHONE (OUTPATIENT)
Dept: ORTHOPEDICS | Facility: CLINIC | Age: 45
End: 2020-10-27

## 2020-10-27 DIAGNOSIS — M25.519 SHOULDER PAIN, UNSPECIFIED CHRONICITY, UNSPECIFIED LATERALITY: Primary | ICD-10-CM

## 2020-11-11 ENCOUNTER — PATIENT MESSAGE (OUTPATIENT)
Dept: PRIMARY CARE CLINIC | Facility: CLINIC | Age: 45
End: 2020-11-11

## 2020-11-12 RX ORDER — BUPROPION HYDROCHLORIDE 300 MG/1
300 TABLET ORAL DAILY
Qty: 30 TABLET | Refills: 1 | Status: SHIPPED | OUTPATIENT
Start: 2020-11-12 | End: 2020-12-17 | Stop reason: SDUPTHER

## 2020-11-12 NOTE — TELEPHONE ENCOUNTER
Care Due:                  Date            Visit Type   Department     Provider  --------------------------------------------------------------------------------                                             [facility]  Last Visit: 11-      NEW PATIENT  CARE           Ari Donovan  Next Visit: None Scheduled  None         None Found                                                            Last  Test          Frequency    Reason                     Performed    Due Date  --------------------------------------------------------------------------------    Office Visit  12 months..  FLUoxetine...............  11-   10-    Powered by [employer]. Reference number: [id number]. 11/12/2020 7:27:42 AM   CST

## 2020-11-20 ENCOUNTER — HOSPITAL ENCOUNTER (OUTPATIENT)
Dept: RADIOLOGY | Facility: HOSPITAL | Age: 45
Discharge: HOME | End: 2020-11-20
Attending: PHYSICIAN ASSISTANT
Payer: COMMERCIAL

## 2020-11-20 ENCOUNTER — OFFICE VISIT (OUTPATIENT)
Dept: SPORTS MEDICINE | Facility: CLINIC | Age: 45
End: 2020-11-20
Payer: COMMERCIAL

## 2020-11-20 VITALS
DIASTOLIC BLOOD PRESSURE: 91 MMHG | HEART RATE: 81 BPM | HEIGHT: 73 IN | WEIGHT: 261 LBS | SYSTOLIC BLOOD PRESSURE: 140 MMHG | BODY MASS INDEX: 34.59 KG/M2

## 2020-11-20 DIAGNOSIS — M25.511 ACUTE PAIN OF RIGHT SHOULDER: Primary | ICD-10-CM

## 2020-11-20 DIAGNOSIS — M25.511 RIGHT SHOULDER PAIN, UNSPECIFIED CHRONICITY: ICD-10-CM

## 2020-11-20 DIAGNOSIS — M75.41 SHOULDER IMPINGEMENT SYNDROME, RIGHT: ICD-10-CM

## 2020-11-20 DIAGNOSIS — G56.21 ULNAR NEUROPATHY AT ELBOW, RIGHT: ICD-10-CM

## 2020-11-20 PROCEDURE — 1126F AMNT PAIN NOTED NONE PRSNT: CPT | Mod: S$GLB,,, | Performed by: PHYSICIAN ASSISTANT

## 2020-11-20 PROCEDURE — 1126F PR PAIN SEVERITY QUANTIFIED, NO PAIN PRESENT: ICD-10-PCS | Mod: S$GLB,,, | Performed by: PHYSICIAN ASSISTANT

## 2020-11-20 PROCEDURE — 3008F BODY MASS INDEX DOCD: CPT | Mod: CPTII,S$GLB,, | Performed by: PHYSICIAN ASSISTANT

## 2020-11-20 PROCEDURE — 73030 X-RAY EXAM OF SHOULDER: CPT | Mod: 26,RT,, | Performed by: RADIOLOGY

## 2020-11-20 PROCEDURE — 3008F PR BODY MASS INDEX (BMI) DOCUMENTED: ICD-10-PCS | Mod: CPTII,S$GLB,, | Performed by: PHYSICIAN ASSISTANT

## 2020-11-20 PROCEDURE — 99999 PR PBB SHADOW E&M-EST. PATIENT-LVL IV: ICD-10-PCS | Mod: PBBFAC,,, | Performed by: PHYSICIAN ASSISTANT

## 2020-11-20 PROCEDURE — 99203 OFFICE O/P NEW LOW 30 MIN: CPT | Mod: S$GLB,,, | Performed by: PHYSICIAN ASSISTANT

## 2020-11-20 PROCEDURE — 99999 PR PBB SHADOW E&M-EST. PATIENT-LVL IV: CPT | Mod: PBBFAC,,, | Performed by: PHYSICIAN ASSISTANT

## 2020-11-20 PROCEDURE — 73030 XR SHOULDER COMPLETE 2 OR MORE VIEWS RIGHT: ICD-10-PCS | Mod: 26,RT,, | Performed by: RADIOLOGY

## 2020-11-20 PROCEDURE — 73030 X-RAY EXAM OF SHOULDER: CPT | Mod: TC,RT

## 2020-11-20 PROCEDURE — 99203 PR OFFICE/OUTPT VISIT, NEW, LEVL III, 30-44 MIN: ICD-10-PCS | Mod: S$GLB,,, | Performed by: PHYSICIAN ASSISTANT

## 2020-11-20 NOTE — PATIENT INSTRUCTIONS
Understanding Shoulder Impingement Syndrome and scapular dyskinesia    Shoulder impingement syndrome is a problem with the shoulder joint. It occurs when certain parts within the joint swell and are pinched. This can cause nagging pain and problems with moving the arm.  What causes shoulder impingement syndrome?  It is possible to develop impingement after years of normal shoulder use. But in most cases the condition occurs because of repeated overhead movements. These include such things as stocking shelves, painting, swimming, and throwing. These movements can irritate parts of the shoulder, leading to swelling. Swollen parts of the shoulder take up more room, making the joint space smaller. Some parts that may be involved include:  · A sac of fluid (bursa) that cushions the shoulder joint.  When the bursa is irritated, it may lead to a condition called bursitis. This is when the bursa swells with fluid, filling and squeezing the joint space.  · Fibrous tissues (tendons) that connect muscle to bone. When tendons are irritated, they may become swollen. This is called tendonitis.  · The end (acromion) of the shoulder blade. This bone may be flat or hooked. If the acromion is hooked, the joint space may be smaller than normal. Growths (bone spurs) on the acromion can also narrow the joint space. Acromion problems dont often cause impingement. But they can make it worse.  Symptoms of shoulder impingement syndrome  Symptoms include pain, pinching, or stiffness in your shoulder. Pain often comes with movement, particularly reaching overhead or backward. It may also be felt when the shoulder is at rest. Pain at night during sleep is common.  Treatment for shoulder impingement syndrome  Treatment will depend on the cause of the problem, how bad the problem is, and if other parts of the shoulder are damaged. Treatment may include the following:  · Active rest. This allows the shoulder to heal. It means using the arm and  shoulder, but avoiding activities that cause pain. These likely include reaching overhead or sleeping on your shoulder.  · Cold packs. These help reduce swelling and relieve pain.  · Prescription or over-the-counter pain medicines. These help relieve pain and swelling.  · Arm and shoulder exercises. These help keep your shoulder joint mobile as it heals. They also help improve muscle strength around the joint.  · Shots of medicine into the joint. This can help reduce swelling and pain for a short time.  If other measures dont work to relieve symptoms, you may need surgery. This opens up space in the joint to allow pain-free motion.  Possible complications of shoulder impingement syndrome  It might be tempting to stop using your shoulder completely to avoid pain. But doing so may lead to a condition called frozen shoulder. To help prevent this, follow instructions you are given for active rest and for doing exercises to help your shoulder heal.  When to call your healthcare provider  Call your healthcare provider right away if you have any of these:  · Fever of 100.4°F (38°C) or higher, or as directed  · Symptoms that dont get better, or get worse  · New symptoms   Date Last Reviewed: 3/10/2016  © 8250-1010 [employer]. [address] [place] [address], [place] [address]. All rights reserved. This information is not intended as a substitute for professional medical care. Always follow your healthcare professional's instructions.

## 2020-11-27 ENCOUNTER — CLINICAL SUPPORT (OUTPATIENT)
Dept: REHABILITATION | Facility: HOSPITAL | Age: 45
End: 2020-11-27
Payer: COMMERCIAL

## 2020-11-27 DIAGNOSIS — M25.511 CHRONIC RIGHT SHOULDER PAIN: ICD-10-CM

## 2020-11-27 DIAGNOSIS — G89.29 CHRONIC RIGHT SHOULDER PAIN: ICD-10-CM

## 2020-11-27 DIAGNOSIS — M25.511 RIGHT SHOULDER PAIN, UNSPECIFIED CHRONICITY: ICD-10-CM

## 2020-11-27 PROCEDURE — 97112 NEUROMUSCULAR REEDUCATION: CPT

## 2020-11-27 PROCEDURE — 97110 THERAPEUTIC EXERCISES: CPT

## 2020-11-27 PROCEDURE — 97163 PT EVAL HIGH COMPLEX 45 MIN: CPT

## 2020-11-27 NOTE — PLAN OF CARE
[facility]  Physical Therapy Initial Evaluation    Date: 11/27/2020   Name: Jacinto Alexander  Clinic Number: [id number]    Therapy Diagnosis:   Encounter Diagnoses   Name Primary?    Right shoulder pain, unspecified chronicity     Chronic right shoulder pain      Physician: Pepe Brown III, *    Physician Orders: PT Eval and Treat   Medical Diagnosis from Referral: M25.511 (ICD-10-CM) - Right shoulder pain, unspecified chronicity  Evaluation Date: 11/27/2020  Authorization Period Expiration: 12/31/2020  Plan of Care Expiration: 1/30/2021  Visit # / Visits authorized: 1/ 1    Time In: 1340  Time Out: 1425  Total Appointment Time (timed & untimed codes): 45 minutes    Precautions: Standard    Subjective   Date of onset: October, 2020  History of current condition - Son reports: Played a volleyball tournament and work up the next morning with signiificant shoulder pain and stiffness. Has had previous issues with the same shoulder which were treated with injection. Also reports intermittent numbness in the ulnar distribution though this is not his primary complaint. Shoulder pain/stiffness is noted when he reaches overhead, behind his back, or with ER.      Pain:  Current 0/10, worst 5/10, best 0/10   Location: right shoulder, anterior and superior. Intermittent numbness in lateral hand.   Description: Aching and Numb  Aggravating Factors: reaching overhead or behind back, volleyball  Easing Factors: rest    Pts goals: Resume playing volleyball without pain    Medical History:   No past medical history on file.    Surgical History:   Jacinto Alexander  has a past surgical history that includes Cholecystectomy and Vasectomy.    Medications:   Jacinto has a current medication list which includes the following prescription(s): bupropion and fluoxetine.    Allergies:   Review of patient's allergies indicates:  No Known Allergies     Imaging, bone scan films: FINDINGS:  Three views  "right shoulder: No fracture dislocation bone destruction seen.    Prior Therapy: No  Exercise Routine/Sports Participation: Volleyball  Prior Level of Function: Intermittent shoulder pain  Current Level of Function:  Unable to play volleyball due to shoulder pain    Objective     Posture: Decreased thoracic kyphosis with barrel chested posture. Right shoulder is positioned anteriorly compared to its counterpart. Both scapulas are abducted and anteriorly tipped at rest. Right scapula does not reach mid axillary line with active reaching nor posteriorly tilt.       Passive Range of Motion:   Shoulder Right Left   Flexion 180 (ERP) 180   Abduction 180 (ERP) 180   ER at 90 90  90   IR 50 50      Active Range of Motion:   Shoulder Right Left   Flexion 160 (ERP) 180   Abduction 160 (ERP) 180   ER  20 45   IR 40 40     Strength:  Shoulder Right Left   Flexion 3-/5 5/5   Abduction 3-/5 (pain) 5/5   ER 3-/5  5/5   IR 3/5 3/5   Middle Trapezius 3-/5 4/5   Serratus Anterior 4/5 4/5   Lower Trapezius 3-/5 3-/5       Special Tests:   Right Left   Drop Arm test - -   Hawkin's Kenndy + -   Anterior Apprehension test - -   Relocation test - -   Posterior Apprehension test - -   Sulcus Sign - -       Joint Mobility: Hypomobile posterior and inferior glide    Palpation: Negative tenderness    Sensation: Numbness in the lateral hand    Flexibility: short pec and lats BUE      Limitation/Restriction for FOTO shoulder Survey    Therapist reviewed FOTO scores for [employer] May on 11/27/2020.   FOTO documents entered into [employer] - see Media section.    Limitation Score: 60%         TREATMENT   Treatment Time In: 1400  Treatment Time Out: 1420  Total Treatment time (time-based codes) separate from Evaluation: 15 minutes    Son received therapeutic exercises to develop strength, endurance, ROM and flexibility for 12 minutes including:  Prone T lvl 3 x 10 with 5" hold  Standing shoulder ER walk outs 5" hold 3 x 10  Time includes " education      Son participated in neuromuscular re-education activities to improve: Balance, Coordination, Kinesthetic and Proprioception for 8 minutes. The following activities were included:  Ulnar nerve mobilization 3 x 5  Time includes education    Education provided:   - Diagnosis and prognosis    Written Home Exercises Provided: yes.  Exercises were reviewed and Son was able to demonstrate them prior to the end of the session.  Son demonstrated good  understanding of the education provided.     See EMR under Patient Instructions for exercises provided 11/27/2020.    Assessment   Jacinto is a 45 y.o. male referred to outpatient Physical Therapy with a medical diagnosis of right rotator cuff related shoulder pain. Pt presents with scapular dyskinesis, rotator cuff weakness, poor posterior humeral glide, and insufficent scapular upward rotation/posterior tilt.    Pt prognosis is Excellent.   Pt will benefit from skilled outpatient Physical Therapy to address the deficits stated above and in the chart below, provide pt/family education, and to maximize pt's level of independence.     Plan of care discussed with patient: Yes  Pt's spiritual, cultural and educational needs considered and patient is agreeable to the plan of care and goals as stated below:     Anticipated Barriers for therapy: None    Medical Necessity is demonstrated by the following  History  Co-morbidities and personal factors that may impact the plan of care Co-morbidities:   advanced age, high BMI and Chronicity    Personal Factors:   no deficits     high   Examination  Body Structures and Functions, activity limitations and participation restrictions that may impact the plan of care Body Regions:   upper extremities    Body Systems:    gross symmetry  ROM  strength  gross coordinated movement  balance    Participation Restrictions:   Unable to play volleyball    Activity limitations:   Learning and applying knowledge  No  deficits    General Tasks and Commands  {No deficits    Communication  No deficits    Mobility  lifting and carrying objects    Self care  No deficits    Domestic Life  No deficits    Interactions/Relationships  No deficits    Life Areas  NA    Community and Social Life  No deficits         high   Clinical Presentation unstable clinical presentation with unpredictable characteristics high   Decision Making/ Complexity Score: high     GOALS: Short Term Goals:  2 weeks  1.Report decreased shoulder pain < / =  2/10 with reaching  to increase tolerance for exercise   3. Increased strength by 1/3 MMT grade in gross shoulder girdle to increase tolerance for ADL and work activities.  4. Pt to tolerate HEP to improve ROM and independence with ADL's    Long Term Goals: 8 weeks  1.Report decreased shoulder pain  < / =  0 /10 with reaching to increase tolerance for volleyball  2.Increase AROM to equal the contralateral limb  3.Increase strength to >/= 4/5 in gross shoulder girdle to increase tolerance for ADL and work activities.  4. Pt goal: resume playing volleyball without pain  5. Pt will have improved gcode of CJ (20-40% limited) on FOTO shoulder in order to demonstrate true functional improvement.      Plan     Outpatient Physical Therapy 1 times weekly for 6 weeks to include the following interventions: manual therapy, therapeutic exercise, therapeutic activities, and neuromuscular re-education.    Chau Balderas, PT

## 2020-11-30 NOTE — PROGRESS NOTES
CC: right shoulder pain     45 y.o. Male who reports that the pain is severe and not responding to any conservative care.  RHD.    Pain began in October 2020 following a [place] tournament. Pain began the next day. Pain is located of the anterior and superior shoulder area.   He reports shoulder pain in the past to this shoulder that resolved with an injection.     He reports that pain is aching and sharp and worse with movements of his shoulder.     He reports that the pain is worse with overhead activity. It also bothers him at night.    Is affecting ADLs.     He also notes some numbness and tingling of his medial elbow that radiates down to his 4th and 5th digits. This is new also and started after his shoulder pain began.     Review of Systems   Constitution: Negative. Negative for chills, fever and night sweats.   HENT: Negative for congestion and headaches.    Eyes: Negative for blurred vision, left vision loss and right vision loss.   Cardiovascular: Negative for chest pain and syncope.   Respiratory: Negative for cough and shortness of breath.    Endocrine: Negative for polydipsia, polyphagia and polyuria.   Hematologic/Lymphatic: Negative for bleeding problem. Does not bruise/bleed easily.   Skin: Negative for dry skin, itching and rash.   Musculoskeletal: Negative for falls and muscle weakness.   Gastrointestinal: Negative for abdominal pain and bowel incontinence.   Genitourinary: Negative for bladder incontinence and nocturia.   Neurological: Negative for disturbances in coordination, loss of balance and seizures.   Psychiatric/Behavioral: Negative for depression. The patient does not have insomnia.    Allergic/Immunologic: Negative for hives and persistent infections.     PAST MEDICAL HISTORY: History reviewed. No pertinent past medical history.  PAST SURGICAL HISTORY:   Past Surgical History:   Procedure Laterality Date    CHOLECYSTECTOMY      VASECTOMY       FAMILY HISTORY:   Family History   Problem  "Relation Age of Onset    Heart disease Mother     Heart disease Father     Lung cancer Father     Stroke Father      SOCIAL HISTORY:   Social History     Socioeconomic History    Marital status:      Spouse name: Not on file    Number of children: Not on file    Years of education: Not on file    Highest education level: Not on file   Occupational History    Not on file   Social Needs    Financial resource strain: Not on file    Food insecurity     Worry: Not on file     Inability: Not on file    Transportation needs     Medical: Not on file     Non-medical: Not on file   Tobacco Use    Smoking status: Former Smoker     Years: 19.00     Types: Cigarettes    Smokeless tobacco: Never Used   Substance and Sexual Activity    Alcohol use: Yes     Comment: occasional    Drug use: Never    Sexual activity: Yes     Partners: Female     Birth control/protection: Partner-Vasectomy   Lifestyle    Physical activity     Days per week: Not on file     Minutes per session: Not on file    Stress: Not on file   Relationships    Social connections     Talks on phone: Not on file     Gets together: Not on file     Attends Baptism service: Not on file     Active member of club or organization: Not on file     Attends meetings of clubs or organizations: Not on file     Relationship status: Not on file   Other Topics Concern    Not on file   Social History Narrative    Not on file       MEDICATIONS:   Current Outpatient Medications:     buPROPion (WELLBUTRIN XL) 300 MG 24 hr tablet, Take 1 tablet (300 mg total) by mouth once daily., Disp: 30 tablet, Rfl: 1    FLUoxetine 20 MG capsule, Take 1 capsule (20 mg total) by mouth once daily., Disp: 90 capsule, Rfl: 3  ALLERGIES: Review of patient's allergies indicates:  No Known Allergies    VITAL SIGNS: BP (!) 140/91   Pulse 81   Ht 6' 1" (1.854 m)   Wt 118.4 kg (261 lb)   BMI 34.43 kg/m²      PHYSICAL EXAMINATION:  General:  The patient is alert and " oriented x 3.  Mood is pleasant.  Observation of ears, eyes and nose reveal no gross abnormalities.  No labored breathing observed.  Gait is coordinated. Patient can toe walk and heel walk without difficulty.      right SHOULDER / UPPER EXTREMITY EXAM    OBSERVATION:     Swelling  none  Deformity  none   Discoloration  none   Scapular winging none   Scars   none  Atrophy  none    TENDERNESS / CREPITUS (T/C):          T/C      T/C   Clavicle   -/-  SUPRAspinatus    -/-     AC Jt.    -/-  INFRAspinatus  -/-    SC Jt.    -/-  Deltoid    -/-      G. Tuberosity  -/-  LH BICEP groove  +/-   Acromion:  -/-  Midline Neck   -/-     Scapular Spine -/-  Trapezium   -/-   SMA Scapula  -/-  GH jt. line - post  -/-     Scapulothoracic  -/-         ROM: (* = with pain)  Right shoulder   Left shoulder        AROM (PROM)   AROM (PROM)   FE    160° (175°)*     170° (175°)     ER at 0°    30°  (35°)    45°  (45°)   ER at 90° ABD  90°  (90°)    90°  (90°)   IR at 90°  ABD   NA  (40°)     NA  (40°)      IR (spine level)   T10     T10    STRENGTH: (* = with pain) RIGHT SHOULDER  LEFT SHOULDER   SCAPTION   5/5    5/5    IR    5/5    5/5   ER    5/5    5/5   BICEPS   5/5    5/5   Deltoid    5/5    5/5     SIGNS:  Painful side       NEER   +    OPALOMAS  neg    WALKER   +    SPEEDS  neg     DROP ARM   neg   BELLY PRESS neg   Superior escape none    LIFT-OFF  neg   X-Body ADD    neg    MOVING VALGUS neg        STABILITY TESTING    RIGHT SHOULDER   LEFT SHOULDER     Translation     Anterior  up face     up face    Posterior  up face    up face    Sulcus   < 10mm    < 10 mm     Signs   Apprehension   neg      neg       Relocation   no change     no change      Jerk test  neg     neg    EXTREMITY NEURO-VASCULAR EXAM    Sensation grossly intact to light touch all dermatomal regions.    DTR 2+ Biceps, Triceps, BR and Negative Karins sign   Grossly intact motor function at Elbow, Wrist and Hand   Distal pulses radial and ulnar 2+, brisk  cap refill, symmetric.      NECK:  Painless FROM and spinous processes non-tender. Negative Spurlings sign.      OTHER FINDINGS:  + scapular dyskinesia    + Tinel's of the medial elbow over ulnar nerve.     XRAYS:  Shoulder 3 view series right,  were obtained and reviewed  No convincing fracture or dislocation is noted. The osseous structures appear well mineralized and well aligned        ASSESSMENT:   right:  1. Shoulder pain,    2.   Shoulder pain, impingement  3.   Scapular dyskinesia  Cubital tunnel syndrome    I feel his ulnar nerve irritation and cubital tunnel syndrome is secondary to his shoulder pain and him using his arm differently.       PLAN:    1. Shoulder impingement, scapular dyskinesia, eval and treat. Give HEP. PT for scapular stabilization: Scapular dyskinesia Scapular stabilization - Marija protocol, 1-3x/week x 6-8 weeks with HEP  Scapular stabilization - Marija protocol, 1-3x/week x 6-8 weeks with HEP    2. NSAIDs and ice compresses as needed for pain.   3. Activity modifications discussed.     4. Hopefull that ulnar nerve symptoms resolves as shoulder improves.     Patient chooses to RTC prn.     All questions were answered, pt will contact us for questions or concerns in the interim.

## 2020-12-08 ENCOUNTER — CLINICAL SUPPORT (OUTPATIENT)
Dept: REHABILITATION | Facility: HOSPITAL | Age: 45
End: 2020-12-08
Payer: COMMERCIAL

## 2020-12-08 DIAGNOSIS — M25.511 CHRONIC RIGHT SHOULDER PAIN: ICD-10-CM

## 2020-12-08 DIAGNOSIS — G89.29 CHRONIC RIGHT SHOULDER PAIN: ICD-10-CM

## 2020-12-08 PROCEDURE — 97112 NEUROMUSCULAR REEDUCATION: CPT

## 2020-12-08 PROCEDURE — 97110 THERAPEUTIC EXERCISES: CPT

## 2020-12-08 NOTE — PROGRESS NOTES
"    Physical Therapy Daily Treatment Note     Name: Jacinto Alexander  Clinic Number: [id number]    Therapy Diagnosis:   Encounter Diagnosis   Name Primary?    Chronic right shoulder pain      Physician: Pepe Brown III, *    Visit Date: 12/8/2020  Physician Orders: PT Eval and Treat   Medical Diagnosis from Referral: M25.511 (ICD-10-CM) - Right shoulder pain, unspecified chronicity  Evaluation Date: 11/27/2020  Authorization Period Expiration: 12/31/2020  Plan of Care Expiration: 1/30/2021  Visit # / Visits authorized: 1/ 1    Time In: 1045  Time Out: 1130  Total Billable Time: 45 minutes    Precautions: Standard    Subjective     Pt reports: More pain in the morning which started a few days after his evaluation. Does not believe it is related to his exercises or evaluation.    He was not compliant with home exercise program.  Response to previous treatment: NA  Functional change: NA    Pain: 3/10  Location: right shoulder      Objective   Son received therapeutic exercises to develop strength, endurance, ROM and flexibility for 20 minutes including:  Prone T lvl 3 x 10 with 5" hold  Standing scapular posterior tilt on wall 20x  Standing shoulder ER walk outs 5" hold 3 x 10        Son participated in neuromuscular re-education activities to improve: Balance, Coordination, Kinesthetic and Proprioception for 25 minutes. The following activities were included:  Ulnar nerve mobilization 3 x 5  Prone Y with manual assistance for positioning 10 x 10"  Land mines with barbell 4 x 8      Home Exercises Provided and Patient Education Provided     Education provided:   - Added prone Y to HEP    Written Home Exercises Provided: yes.  Exercises were reviewed and Son was able to demonstrate them prior to the end of the session.  Son demonstrated good  understanding of the education provided.     See EMR under Patient Instructions for exercises provided prior visit.    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 Requires cuing with scapular exercises. Attempted to progress RTC strength with isometrics but recreated concordant sign. Will nee to improve compliance with HEP.     Son is progressing well towards his goals.   Pt prognosis is Excellent.     Pt will continue to benefit from skilled outpatient physical therapy to address the deficits listed in the problem list box on initial evaluation, provide pt/family education and to maximize pt's level of independence in the home and community environment.     Pt's spiritual, cultural and educational needs considered and pt agreeable to plan of care and goals.    Anticipated barriers to physical therapy: none    GOALS: Short Term Goals:  2 weeks  1.Report decreased shoulder pain < / =  2/10 with reaching  to increase tolerance for exercise   3. Increased strength by 1/3 MMT grade in gross shoulder girdle to increase tolerance for ADL and work activities.  4. Pt to tolerate HEP to improve ROM and independence with ADL's     Long Term Goals: 8 weeks  1.Report decreased shoulder pain  < / =  0 /10 with reaching to increase tolerance for volleyball  2.Increase AROM to equal the contralateral limb  3.Increase strength to >/= 4/5 in gross shoulder girdle to increase tolerance for ADL and work activities.  4. Pt goal: resume playing volleyball without pain  5. Pt will have improved gcode of CJ (20-40% limited) on FOTO shoulder in order to demonstrate true functional improvement.    Plan   Outpatient Physical Therapy 1 times weekly for 6 weeks to include the following interventions: manual therapy, therapeutic exercise, therapeutic activities, and neuromuscular re-education.       Chau Balderas, PT, DPT

## 2020-12-14 ENCOUNTER — CLINICAL SUPPORT (OUTPATIENT)
Dept: REHABILITATION | Facility: HOSPITAL | Age: 45
End: 2020-12-14
Payer: COMMERCIAL

## 2020-12-14 DIAGNOSIS — G89.29 CHRONIC RIGHT SHOULDER PAIN: ICD-10-CM

## 2020-12-14 DIAGNOSIS — M25.511 CHRONIC RIGHT SHOULDER PAIN: ICD-10-CM

## 2020-12-14 PROCEDURE — 97110 THERAPEUTIC EXERCISES: CPT

## 2020-12-14 PROCEDURE — 97140 MANUAL THERAPY 1/> REGIONS: CPT

## 2020-12-14 NOTE — PROGRESS NOTES
"    Physical Therapy Daily Treatment Note     Name: Jacinto Alexander  Clinic Number: [id number]    Therapy Diagnosis:   Encounter Diagnosis   Name Primary?    Chronic right shoulder pain      Physician: Pepe Brown III, *    Visit Date: 12/14/2020  Physician Orders: PT Eval and Treat   Medical Diagnosis from Referral: M25.511 (ICD-10-CM) - Right shoulder pain, unspecified chronicity  Evaluation Date: 11/27/2020  Authorization Period Expiration: 12/31/2020  Plan of Care Expiration: 1/30/2021  Visit # / Visits authorized: 1/ 1    Time In: 1510  Time Out: 1605  Total Billable Time: 45 minutes    Precautions: Standard    Subjective     Pt reports: Felt great after the previous session. Has pain when he sleeps on it at night.    He was not compliant with home exercise program.  Response to previous treatment: NA  Functional change: NA    Pain: 1/10  Location: right shoulder      Objective   Son received therapeutic exercises to develop strength, endurance, ROM and flexibility for 30 minutes including:  Prone T lvl 4 x 8 with 5" hold  Prone Y lvl 3 x 8 with 5" hold  Standing shoulder ER and IR walk outs 3 x 6 ea, OTB, 90 degrees ABD        Son participated in neuromuscular re-education activities to improve: Balance, Coordination, Kinesthetic and Proprioception for 15 minutes. The following activities were included:  Ulnar nerve mobilization 3 x 5  Land mines with barbell 4 x 8      Home Exercises Provided and Patient Education Provided     Education provided:   - Added prone Y to HEP    Written Home Exercises Provided: yes.  Exercises were reviewed and Son was able to demonstrate them prior to the end of the session.  Son demonstrated good  understanding of the education provided.     See EMR under Patient Instructions for exercises provided prior visit.    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 Scapular strength " and activation improved significantly as seen by decreased cuing required with trapezius activation. Progressed to RTC activation in 90 degrees abduction without pain.     Son is progressing well towards his goals.   Pt prognosis is Excellent.     Pt will continue to benefit from skilled outpatient physical therapy to address the deficits listed in the problem list box on initial evaluation, provide pt/family education and to maximize pt's level of independence in the home and community environment.     Pt's spiritual, cultural and educational needs considered and pt agreeable to plan of care and goals.    Anticipated barriers to physical therapy: none    GOALS: Short Term Goals:  2 weeks  1.Report decreased shoulder pain < / =  2/10 with reaching  to increase tolerance for exercise   3. Increased strength by 1/3 MMT grade in gross shoulder girdle to increase tolerance for ADL and work activities.  4. Pt to tolerate HEP to improve ROM and independence with ADL's     Long Term Goals: 8 weeks  1.Report decreased shoulder pain  < / =  0 /10 with reaching to increase tolerance for volleyball  2.Increase AROM to equal the contralateral limb  3.Increase strength to >/= 4/5 in gross shoulder girdle to increase tolerance for ADL and work activities.  4. Pt goal: resume playing volleyball without pain  5. Pt will have improved gcode of CJ (20-40% limited) on FOTO shoulder in order to demonstrate true functional improvement.    Plan   Outpatient Physical Therapy 1 times weekly for 6 weeks to include the following interventions: manual therapy, therapeutic exercise, therapeutic activities, and neuromuscular re-education.       Chau Balderas, PT, DPT

## 2020-12-17 ENCOUNTER — OFFICE VISIT (OUTPATIENT)
Dept: FAMILY MEDICINE | Facility: CLINIC | Age: 45
End: 2020-12-17
Payer: COMMERCIAL

## 2020-12-17 VITALS
SYSTOLIC BLOOD PRESSURE: 116 MMHG | TEMPERATURE: 98 F | HEART RATE: 74 BPM | BODY MASS INDEX: 34.95 KG/M2 | DIASTOLIC BLOOD PRESSURE: 82 MMHG | HEIGHT: 73 IN | WEIGHT: 263.69 LBS | OXYGEN SATURATION: 96 %

## 2020-12-17 DIAGNOSIS — E29.1 HYPOGONADISM MALE: ICD-10-CM

## 2020-12-17 DIAGNOSIS — Z11.59 SCREENING FOR VIRAL DISEASE: ICD-10-CM

## 2020-12-17 DIAGNOSIS — Z11.4 SCREENING FOR HIV (HUMAN IMMUNODEFICIENCY VIRUS): ICD-10-CM

## 2020-12-17 DIAGNOSIS — N52.9 ERECTILE DYSFUNCTION, UNSPECIFIED ERECTILE DYSFUNCTION TYPE: ICD-10-CM

## 2020-12-17 DIAGNOSIS — G47.33 OSA (OBSTRUCTIVE SLEEP APNEA): ICD-10-CM

## 2020-12-17 DIAGNOSIS — Z00.00 ANNUAL PHYSICAL EXAM: Primary | ICD-10-CM

## 2020-12-17 DIAGNOSIS — F33.0 MILD RECURRENT MAJOR DEPRESSION: ICD-10-CM

## 2020-12-17 PROCEDURE — 1126F AMNT PAIN NOTED NONE PRSNT: CPT | Mod: S$GLB,,, | Performed by: INTERNAL MEDICINE

## 2020-12-17 PROCEDURE — 99396 PR PREVENTIVE VISIT,EST,40-64: ICD-10-PCS | Mod: S$GLB,,, | Performed by: INTERNAL MEDICINE

## 2020-12-17 PROCEDURE — 3008F BODY MASS INDEX DOCD: CPT | Mod: CPTII,S$GLB,, | Performed by: INTERNAL MEDICINE

## 2020-12-17 PROCEDURE — 99999 PR PBB SHADOW E&M-EST. PATIENT-LVL III: CPT | Mod: PBBFAC,,, | Performed by: INTERNAL MEDICINE

## 2020-12-17 PROCEDURE — 99999 PR PBB SHADOW E&M-EST. PATIENT-LVL III: ICD-10-PCS | Mod: PBBFAC,,, | Performed by: INTERNAL MEDICINE

## 2020-12-17 PROCEDURE — 3008F PR BODY MASS INDEX (BMI) DOCUMENTED: ICD-10-PCS | Mod: CPTII,S$GLB,, | Performed by: INTERNAL MEDICINE

## 2020-12-17 PROCEDURE — 1126F PR PAIN SEVERITY QUANTIFIED, NO PAIN PRESENT: ICD-10-PCS | Mod: S$GLB,,, | Performed by: INTERNAL MEDICINE

## 2020-12-17 PROCEDURE — 99396 PREV VISIT EST AGE 40-64: CPT | Mod: S$GLB,,, | Performed by: INTERNAL MEDICINE

## 2020-12-17 RX ORDER — FLUOXETINE HYDROCHLORIDE 20 MG/1
20 CAPSULE ORAL DAILY
Qty: 90 CAPSULE | Refills: 3 | Status: SHIPPED | OUTPATIENT
Start: 2020-12-17 | End: 2021-07-29

## 2020-12-17 RX ORDER — TADALAFIL 20 MG/1
20 TABLET ORAL DAILY PRN
Qty: 30 TABLET | Refills: 5 | Status: SHIPPED | OUTPATIENT
Start: 2020-12-17 | End: 2021-12-17

## 2020-12-17 RX ORDER — BUPROPION HYDROCHLORIDE 300 MG/1
300 TABLET ORAL DAILY
Qty: 90 TABLET | Refills: 3 | Status: SHIPPED | OUTPATIENT
Start: 2020-12-17 | End: 2022-01-02 | Stop reason: SDUPTHER

## 2020-12-17 NOTE — PROGRESS NOTES
[facility] Note    Chief Complaint      Chief Complaint   Patient presents with    Annual Exam       History of Present Illness      Jacinto Alexander is a 45 y.o. male with chronic conditions of hypogonadism, depression, LANEY who presents today for: annual preventative visit.  Complains of erectile dysfunction.    Hypogonadism: Saw Dr. Avril Elmore, endocriniology, who attributed low T to sleep apnea.  Has not followed up.  Depression: Sees Dr. Denise.  On prozac and wellbutrin.    LANEY: intolerant to CPAP.  Will see ENT>  Diet: Prepares own food mostly.  Needs to cut back on fatty foods and carbs.  Working on portion control.  Drinks plenty water.  Exercise: Working out at gym  Denies drinking and driving, drinking more than 4 drinks on occasion, drug use.     Flu shot declines.  TdAP 2019.    Past Medical History:  History reviewed. No pertinent past medical history.    Past Surgical History:   has a past surgical history that includes Cholecystectomy; Vasectomy; Hernia repair; and Tonsillectomy.    Family History:  family history includes Cancer in his father; Hearing loss in his father; Heart disease in his father and mother; Learning disabilities in his daughter; Lung cancer in his father; Stroke in his father.     Social History:  Social History     Tobacco Use    Smoking status: Former Smoker     Packs/day: 1.00     Years: 15.00     Pack years: 15.00     Types: Cigarettes     Quit date: 3/12/2012     Years since quittin.7    Smokeless tobacco: Never Used   Substance Use Topics    Alcohol use: Not Currently     Alcohol/week: 0.0 standard drinks     Frequency: Monthly or less     Drinks per session: 1 or 2     Binge frequency: Never     Comment: occasional    Drug use: Not Currently       I personally reviewed all past medical, surgical, social and family history.    Review of Systems   Constitutional: Negative for chills, fever and malaise/fatigue.   HENT: Negative for hearing loss.   "  Eyes: Negative for discharge.   Respiratory: Negative for shortness of breath and wheezing.    Cardiovascular: Negative for chest pain and palpitations.   Gastrointestinal: Negative for blood in stool, constipation, diarrhea, nausea and vomiting.   Genitourinary: Negative for hematuria and urgency.   Musculoskeletal: Negative for neck pain.   Skin: Negative for rash.   Neurological: Negative for weakness and headaches.   Endo/Heme/Allergies: Negative for polydipsia.   All other systems reviewed and are negative.       Medications:  Outpatient Encounter Medications as of 12/17/2020   Medication Sig Dispense Refill    buPROPion (WELLBUTRIN XL) 300 MG 24 hr tablet Take 1 tablet (300 mg total) by mouth once daily. 90 tablet 3    FLUoxetine 20 MG capsule Take 1 capsule (20 mg total) by mouth once daily. 90 capsule 3    [DISCONTINUED] buPROPion (WELLBUTRIN XL) 300 MG 24 hr tablet Take 1 tablet (300 mg total) by mouth once daily. 30 tablet 1    [DISCONTINUED] FLUoxetine 20 MG capsule Take 1 capsule (20 mg total) by mouth once daily. 90 capsule 3    tadalafiL (CIALIS) 20 MG Tab Take 1 tablet (20 mg total) by mouth daily as needed (60 minutes prior to intercourse). 30 tablet 5     No facility-administered encounter medications on file as of 12/17/2020.        Allergies:  Review of patient's allergies indicates:  No Known Allergies    Health Maintenance:  Immunization History   Administered Date(s) Administered    Influenza 03/16/2020    Tdap 11/01/2019      Health Maintenance   Topic Date Due    Hepatitis C Screening  1975    Lipid Panel  02/24/2025    TETANUS VACCINE  11/01/2029        Physical Exam      Vital Signs  Temp: 97.9 °F (36.6 °C)  Temp src: Oral  Pulse: 74  SpO2: 96 %  BP: 116/82  BP Location: Right arm  Patient Position: Sitting  Pain Score: 0-No pain  Height and Weight  Height: 6' 1" (185.4 cm)  Weight: 119.6 kg (263 lb 10.7 oz)  BSA (Calculated - sq m): 2.48 sq meters  BMI (Calculated): " 34.8  Weight in (lb) to have BMI = 25: 189.1]    Physical Exam  Vitals signs reviewed.   Constitutional:       Appearance: He is well-developed.   HENT:      Head: Normocephalic and atraumatic.      Right Ear: External ear normal.      Left Ear: External ear normal.   Eyes:      Conjunctiva/sclera: Conjunctivae normal.      Pupils: Pupils are equal, round, and reactive to light.   Cardiovascular:      Rate and Rhythm: Normal rate and regular rhythm.      Heart sounds: Normal heart sounds. No murmur.   Pulmonary:      Effort: Pulmonary effort is normal.      Breath sounds: Normal breath sounds. No wheezing or rales.   Abdominal:      General: Bowel sounds are normal. There is no distension or abdominal bruit.      Palpations: Abdomen is soft.      Tenderness: There is no abdominal tenderness.          Laboratory:  CBC:  Recent Labs   Lab 02/24/20  0747   WBC 7.2   RBC 5.45   Hemoglobin 15.7   Hematocrit 46.9   Platelets 237   MCV 86.1   MCH 28.8   MCHC 33.5     CMP:  Recent Labs   Lab 02/24/20  0747 03/10/20  0920   Glucose 90  --    Calcium 9.3  --    Albumin 4.4 4.4   Total Protein 6.9  --    Sodium 140  --    Potassium 4.0  --    CO2 26  --    Chloride 103  --    BUN 11  --    Alkaline Phosphatase 85  --    ALT 17  --    AST 19  --    Total Bilirubin 1.0  --      URINALYSIS:       LIPIDS:  Recent Labs   Lab 02/24/20  0747   TSH 3.06   HDL 30 L   Cholesterol 204 H   Triglycerides 275 H   LDL Cholesterol 132 H   HDL/Cholesterol Ratio 6.8 H   Non HDL Chol. (LDL+VLDL) 174 H     TSH:  Recent Labs   Lab 02/24/20  0747   TSH 3.06     A1C:  Recent Labs   Lab 03/10/20  0920   Hemoglobin A1C 5.1       Assessment/Plan     Jacinto Alexander is a 45 y.o.male with:    1. Annual physical exam  - CBC Auto Differential; Future  - Comprehensive Metabolic Panel; Future  - Hepatitis C Antibody; Future  - HIV 1/2 Ag/Ab (4th Gen); Future  - Lipid Panel; Future  - TSH; Future  - T4, Free; Future  - PSA, Screening; Future  - Testosterone,  Total, Males; Future  - CBC Auto Differential  - Comprehensive Metabolic Panel  - Hepatitis C Antibody  - HIV 1/2 Ag/Ab (4th Gen)  - Lipid Panel  - TSH  - T4, Free  - PSA, Screening  - Testosterone, Total, Males    2. Mild recurrent major depression  - FLUoxetine 20 MG capsule; Take 1 capsule (20 mg total) by mouth once daily.  Dispense: 90 capsule; Refill: 3  - buPROPion (WELLBUTRIN XL) 300 MG 24 hr tablet; Take 1 tablet (300 mg total) by mouth once daily.  Dispense: 90 tablet; Refill: 3    3. Hypogonadism male  - Testosterone, Total, Males; Future  - Testosterone, Total, Males    4. LANEY (obstructive sleep apnea)    5. Erectile dysfunction, unspecified erectile dysfunction type  - tadalafiL (CIALIS) 20 MG Tab; Take 1 tablet (20 mg total) by mouth daily as needed (60 minutes prior to intercourse).  Dispense: 30 tablet; Refill: 5    6. Screening for viral disease  - Hepatitis C Antibody; Future  - Hepatitis C Antibody    7. Screening for HIV (human immunodeficiency virus)  - HIV 1/2 Ag/Ab (4th Gen); Future  - HIV 1/2 Ag/Ab (4th Gen)       Chronic conditions status updated as per HPI.  Other than changes above, cont current medications and maintain follow up with specialists.  Return to clinic in 12 months.    Ari Alanis MD  [facility]                  Answers for HPI/ROS submitted by the patient on 12/11/2020   activity change: No  unexpected weight change: No  rhinorrhea: No  trouble swallowing: No  visual disturbance: No  chest tightness: No  polyuria: No  difficulty urinating: No  confusion: No  dysphoric mood: No

## 2021-01-08 ENCOUNTER — CLINICAL SUPPORT (OUTPATIENT)
Dept: REHABILITATION | Facility: HOSPITAL | Age: 46
End: 2021-01-08
Payer: COMMERCIAL

## 2021-01-08 DIAGNOSIS — G89.29 CHRONIC RIGHT SHOULDER PAIN: ICD-10-CM

## 2021-01-08 DIAGNOSIS — M25.511 CHRONIC RIGHT SHOULDER PAIN: ICD-10-CM

## 2021-01-08 PROCEDURE — 97112 NEUROMUSCULAR REEDUCATION: CPT | Performed by: PHYSICAL THERAPIST

## 2021-01-08 PROCEDURE — 97110 THERAPEUTIC EXERCISES: CPT | Performed by: PHYSICAL THERAPIST

## 2021-01-15 ENCOUNTER — CLINICAL SUPPORT (OUTPATIENT)
Dept: REHABILITATION | Facility: HOSPITAL | Age: 46
End: 2021-01-15
Payer: COMMERCIAL

## 2021-01-15 DIAGNOSIS — G89.29 CHRONIC RIGHT SHOULDER PAIN: ICD-10-CM

## 2021-01-15 DIAGNOSIS — M25.511 CHRONIC RIGHT SHOULDER PAIN: ICD-10-CM

## 2021-01-15 PROCEDURE — 97112 NEUROMUSCULAR REEDUCATION: CPT | Performed by: PHYSICAL THERAPIST

## 2021-01-15 PROCEDURE — 97110 THERAPEUTIC EXERCISES: CPT | Performed by: PHYSICAL THERAPIST

## 2021-01-27 ENCOUNTER — PATIENT OUTREACH (OUTPATIENT)
Dept: ADMINISTRATIVE | Facility: OTHER | Age: 46
End: 2021-01-27

## 2021-07-28 DIAGNOSIS — F33.0 MILD RECURRENT MAJOR DEPRESSION: ICD-10-CM

## 2021-07-29 RX ORDER — FLUOXETINE HYDROCHLORIDE 20 MG/1
CAPSULE ORAL
Qty: 90 CAPSULE | Refills: 1 | Status: SHIPPED | OUTPATIENT
Start: 2021-07-29 | End: 2022-01-02 | Stop reason: SDUPTHER

## 2021-12-22 ENCOUNTER — OFFICE VISIT (OUTPATIENT)
Dept: FAMILY MEDICINE | Facility: CLINIC | Age: 46
End: 2021-12-22
Payer: COMMERCIAL

## 2021-12-22 VITALS
WEIGHT: 255.38 LBS | HEART RATE: 75 BPM | DIASTOLIC BLOOD PRESSURE: 76 MMHG | SYSTOLIC BLOOD PRESSURE: 118 MMHG | OXYGEN SATURATION: 98 % | TEMPERATURE: 98 F | HEIGHT: 73 IN | BODY MASS INDEX: 33.85 KG/M2

## 2021-12-22 DIAGNOSIS — Z11.59 SCREENING FOR VIRAL DISEASE: ICD-10-CM

## 2021-12-22 DIAGNOSIS — Z12.11 SCREEN FOR COLON CANCER: ICD-10-CM

## 2021-12-22 DIAGNOSIS — N52.9 ERECTILE DYSFUNCTION, UNSPECIFIED ERECTILE DYSFUNCTION TYPE: ICD-10-CM

## 2021-12-22 DIAGNOSIS — Z00.00 ANNUAL PHYSICAL EXAM: Primary | ICD-10-CM

## 2021-12-22 DIAGNOSIS — F33.0 MILD RECURRENT MAJOR DEPRESSION: ICD-10-CM

## 2021-12-22 DIAGNOSIS — E29.1 HYPOGONADISM MALE: ICD-10-CM

## 2021-12-22 DIAGNOSIS — G47.33 OSA (OBSTRUCTIVE SLEEP APNEA): ICD-10-CM

## 2021-12-22 PROCEDURE — 1159F PR MEDICATION LIST DOCUMENTED IN MEDICAL RECORD: ICD-10-PCS | Mod: CPTII,S$GLB,, | Performed by: INTERNAL MEDICINE

## 2021-12-22 PROCEDURE — 1159F MED LIST DOCD IN RCRD: CPT | Mod: CPTII,S$GLB,, | Performed by: INTERNAL MEDICINE

## 2021-12-22 PROCEDURE — 3074F PR MOST RECENT SYSTOLIC BLOOD PRESSURE < 130 MM HG: ICD-10-PCS | Mod: CPTII,S$GLB,, | Performed by: INTERNAL MEDICINE

## 2021-12-22 PROCEDURE — 99396 PR PREVENTIVE VISIT,EST,40-64: ICD-10-PCS | Mod: S$GLB,,, | Performed by: INTERNAL MEDICINE

## 2021-12-22 PROCEDURE — 99999 PR PBB SHADOW E&M-EST. PATIENT-LVL III: ICD-10-PCS | Mod: PBBFAC,,, | Performed by: INTERNAL MEDICINE

## 2021-12-22 PROCEDURE — 99999 PR PBB SHADOW E&M-EST. PATIENT-LVL III: CPT | Mod: PBBFAC,,, | Performed by: INTERNAL MEDICINE

## 2021-12-22 PROCEDURE — 3008F PR BODY MASS INDEX (BMI) DOCUMENTED: ICD-10-PCS | Mod: CPTII,S$GLB,, | Performed by: INTERNAL MEDICINE

## 2021-12-22 PROCEDURE — 3074F SYST BP LT 130 MM HG: CPT | Mod: CPTII,S$GLB,, | Performed by: INTERNAL MEDICINE

## 2021-12-22 PROCEDURE — 3008F BODY MASS INDEX DOCD: CPT | Mod: CPTII,S$GLB,, | Performed by: INTERNAL MEDICINE

## 2021-12-22 PROCEDURE — 99396 PREV VISIT EST AGE 40-64: CPT | Mod: S$GLB,,, | Performed by: INTERNAL MEDICINE

## 2021-12-22 PROCEDURE — 3078F DIAST BP <80 MM HG: CPT | Mod: CPTII,S$GLB,, | Performed by: INTERNAL MEDICINE

## 2021-12-22 PROCEDURE — 3078F PR MOST RECENT DIASTOLIC BLOOD PRESSURE < 80 MM HG: ICD-10-PCS | Mod: CPTII,S$GLB,, | Performed by: INTERNAL MEDICINE

## 2021-12-31 ENCOUNTER — PATIENT MESSAGE (OUTPATIENT)
Dept: FAMILY MEDICINE | Facility: CLINIC | Age: 46
End: 2021-12-31
Payer: COMMERCIAL

## 2021-12-31 DIAGNOSIS — F33.0 MILD RECURRENT MAJOR DEPRESSION: ICD-10-CM

## 2022-01-02 NOTE — TELEPHONE ENCOUNTER
No new care gaps identified.  Powered by [employer]. Reference number: [id number].   1/02/2022 3:49:37 PM CST

## 2022-01-03 RX ORDER — BUPROPION HYDROCHLORIDE 300 MG/1
300 TABLET ORAL DAILY
Qty: 90 TABLET | Refills: 3 | Status: SHIPPED | OUTPATIENT
Start: 2022-01-03 | End: 2023-05-21 | Stop reason: SDUPTHER

## 2022-01-03 RX ORDER — FLUOXETINE HYDROCHLORIDE 20 MG/1
20 CAPSULE ORAL DAILY
Qty: 90 CAPSULE | Refills: 1 | Status: SHIPPED | OUTPATIENT
Start: 2022-01-03 | End: 2022-07-11 | Stop reason: SDUPTHER

## 2022-02-03 ENCOUNTER — PATIENT MESSAGE (OUTPATIENT)
Dept: INTERNAL MEDICINE | Facility: CLINIC | Age: 47
End: 2022-02-03
Payer: COMMERCIAL

## 2022-02-03 LAB — NONINV COLON CA DNA+OCC BLD SCRN STL QL: NEGATIVE

## 2022-04-05 NOTE — LETTER
March 16, 2020      Miguel Angel Elmore MD  [address]  [facility] [address]           [facility]  [address]  [place] [address]  Phone: [phone]  Fax: [phone]          Patient: Jacinto Alexander   MR Number: [id number]   YOB: 1975   Date of Visit: 3/16/2020       Dear Dr. Miguel Angel Elmore:    Thank you for referring Jacinto Alexander to me for evaluation. Attached you will find relevant portions of my assessment and plan of care.    If you have questions, please do not hesitate to call me. I look forward to following Jacinto Alexander along with you.    Sincerely,    Ruby Salas MD    Enclosure  CC:  No Recipients    If you would like to receive this communication electronically, please contact [email or web address] or [phone] to request more information on [facility] access.    For providers and/or their staff who would like to refer a patient to [facility], please contact us through our one-stop-shop provider referral line, [facility], at [phone].    If you feel you have received this communication in error or would no longer like to receive these types of communications, please e-mail [email or web address]         
The patient is a 30y Female complaining of abnormal lab result.

## 2022-07-11 DIAGNOSIS — F33.0 MILD RECURRENT MAJOR DEPRESSION: ICD-10-CM

## 2022-07-11 RX ORDER — FLUOXETINE HYDROCHLORIDE 20 MG/1
20 CAPSULE ORAL DAILY
Qty: 90 CAPSULE | Refills: 1 | Status: SHIPPED | OUTPATIENT
Start: 2022-07-11 | End: 2023-01-07

## 2022-07-11 NOTE — TELEPHONE ENCOUNTER
No new care gaps identified.  [facility] Embedded Care Gaps. Reference number: [id number]. 7/11/2022   11:21:40 AM ZOEYT

## 2023-01-07 DIAGNOSIS — F33.0 MILD RECURRENT MAJOR DEPRESSION: ICD-10-CM

## 2023-01-07 RX ORDER — FLUOXETINE HYDROCHLORIDE 20 MG/1
CAPSULE ORAL
Qty: 90 CAPSULE | Refills: 0 | Status: SHIPPED | OUTPATIENT
Start: 2023-01-07 | End: 2023-05-21 | Stop reason: SDUPTHER

## 2023-01-07 NOTE — TELEPHONE ENCOUNTER
Care Due:                  Date            Visit Type   Department     Provider  --------------------------------------------------------------------------------                                MYCHART                              ANNUAL                              CHECKUP/PHY  DESC FAMILY  Last Visit: 12-      [facility]  Ari Donovan  Next Visit: None Scheduled  None         None Found                                                            Last  Test          Frequency    Reason                     Performed    Due Date  --------------------------------------------------------------------------------    Office Visit  12 months..  FLUoxetine, buPROPion....  12- 12-    [facility] Embedded Care Gaps. Reference number: [id number]. 1/07/2023   3:37:42 AM CST

## 2023-01-07 NOTE — TELEPHONE ENCOUNTER
Refill Routing Note   Medication(s) are not appropriate for processing by [facility] for the following reason(s):      - Required vitals are outdated    [facility] action(s):  Approve  Defer          Medication reconciliation completed: No     Appointments  past 12m or future 3m with PCP    Date Provider   Last Visit   12/22/2021 Ari Alanis MD   Next Visit   Visit date not found Ari Alanis MD   ED visits in past 90 days: 0        Note composed:12:37 PM 01/07/2023

## 2023-01-09 RX ORDER — BUPROPION HYDROCHLORIDE 300 MG/1
TABLET ORAL
Qty: 90 TABLET | Refills: 3 | OUTPATIENT
Start: 2023-01-09

## 2023-04-10 DIAGNOSIS — F33.0 MILD RECURRENT MAJOR DEPRESSION: ICD-10-CM

## 2023-04-10 RX ORDER — FLUOXETINE HYDROCHLORIDE 20 MG/1
CAPSULE ORAL
Qty: 90 CAPSULE | Refills: 0 | OUTPATIENT
Start: 2023-04-10

## 2023-04-10 NOTE — TELEPHONE ENCOUNTER
Care Due:                  Date            Visit Type   Department     Provider  --------------------------------------------------------------------------------                                MYCHART                              ANNUAL                              CHECKUP/PHY  DESC FAMILY  Last Visit: 12-      [facility]  Ari Donovan  Next Visit: None Scheduled  None         None Found                                                            Last  Test          Frequency    Reason                     Performed    Due Date  --------------------------------------------------------------------------------    Office Visit  12 months..  FLUoxetine, buPROPion....  12- 12-    [facility] Embedded Care Gaps. Reference number: [id number]. 4/10/2023   3:37:39 AM CDT

## 2023-05-16 DIAGNOSIS — F33.0 MILD RECURRENT MAJOR DEPRESSION: ICD-10-CM

## 2023-05-16 RX ORDER — BUPROPION HYDROCHLORIDE 300 MG/1
300 TABLET ORAL DAILY
Qty: 90 TABLET | Refills: 3 | OUTPATIENT
Start: 2023-05-16

## 2023-05-16 NOTE — TELEPHONE ENCOUNTER
No care due was identified.  [facility] Embedded Care Due Messages. Reference number: [id number].   5/16/2023 7:33:26 AM CDT

## 2023-05-21 ENCOUNTER — PATIENT MESSAGE (OUTPATIENT)
Dept: PRIMARY CARE CLINIC | Facility: CLINIC | Age: 48
End: 2023-05-21
Payer: COMMERCIAL

## 2023-05-21 DIAGNOSIS — F33.0 MILD RECURRENT MAJOR DEPRESSION: ICD-10-CM

## 2023-05-23 ENCOUNTER — PATIENT MESSAGE (OUTPATIENT)
Dept: PRIMARY CARE CLINIC | Facility: CLINIC | Age: 48
End: 2023-05-23
Payer: COMMERCIAL

## 2023-05-23 RX ORDER — BUPROPION HYDROCHLORIDE 300 MG/1
300 TABLET ORAL DAILY
Qty: 90 TABLET | Refills: 0 | Status: SHIPPED | OUTPATIENT
Start: 2023-05-23 | End: 2023-07-24 | Stop reason: SDUPTHER

## 2023-05-23 RX ORDER — FLUOXETINE HYDROCHLORIDE 20 MG/1
20 CAPSULE ORAL DAILY
Qty: 90 CAPSULE | Refills: 0 | Status: SHIPPED | OUTPATIENT
Start: 2023-05-23 | End: 2023-07-24 | Stop reason: SDUPTHER

## 2023-07-24 ENCOUNTER — OFFICE VISIT (OUTPATIENT)
Dept: PRIMARY CARE CLINIC | Facility: CLINIC | Age: 48
End: 2023-07-24
Payer: COMMERCIAL

## 2023-07-24 ENCOUNTER — LAB VISIT (OUTPATIENT)
Dept: LAB | Facility: HOSPITAL | Age: 48
End: 2023-07-24
Attending: INTERNAL MEDICINE
Payer: COMMERCIAL

## 2023-07-24 VITALS
SYSTOLIC BLOOD PRESSURE: 120 MMHG | DIASTOLIC BLOOD PRESSURE: 86 MMHG | BODY MASS INDEX: 35.03 KG/M2 | WEIGHT: 264.31 LBS | HEART RATE: 68 BPM | HEIGHT: 73 IN | OXYGEN SATURATION: 98 %

## 2023-07-24 DIAGNOSIS — E29.1 HYPOGONADISM MALE: ICD-10-CM

## 2023-07-24 DIAGNOSIS — Z00.00 ANNUAL PHYSICAL EXAM: Primary | ICD-10-CM

## 2023-07-24 DIAGNOSIS — F33.0 MILD RECURRENT MAJOR DEPRESSION: ICD-10-CM

## 2023-07-24 DIAGNOSIS — G47.33 OSA (OBSTRUCTIVE SLEEP APNEA): ICD-10-CM

## 2023-07-24 DIAGNOSIS — Z00.00 ANNUAL PHYSICAL EXAM: ICD-10-CM

## 2023-07-24 LAB
ALBUMIN SERPL BCP-MCNC: 4 G/DL (ref 3.5–5.2)
ALP SERPL-CCNC: 87 U/L (ref 55–135)
ALT SERPL W/O P-5'-P-CCNC: 19 U/L (ref 10–44)
ANION GAP SERPL CALC-SCNC: 7 MMOL/L (ref 8–16)
AST SERPL-CCNC: 17 U/L (ref 10–40)
BASOPHILS # BLD AUTO: 0.11 K/UL (ref 0–0.2)
BASOPHILS NFR BLD: 1.3 % (ref 0–1.9)
BILIRUB SERPL-MCNC: 0.8 MG/DL (ref 0.1–1)
BUN SERPL-MCNC: 12 MG/DL (ref 6–20)
CALCIUM SERPL-MCNC: 9.2 MG/DL (ref 8.7–10.5)
CHLORIDE SERPL-SCNC: 107 MMOL/L (ref 95–110)
CHOLEST SERPL-MCNC: 205 MG/DL (ref 120–199)
CHOLEST/HDLC SERPL: 6.8 {RATIO} (ref 2–5)
CO2 SERPL-SCNC: 26 MMOL/L (ref 23–29)
COMPLEXED PSA SERPL-MCNC: 0.58 NG/ML (ref 0–4)
CREAT SERPL-MCNC: 1.2 MG/DL (ref 0.5–1.4)
DIFFERENTIAL METHOD: NORMAL
EOSINOPHIL # BLD AUTO: 0.4 K/UL (ref 0–0.5)
EOSINOPHIL NFR BLD: 4.6 % (ref 0–8)
ERYTHROCYTE [DISTWIDTH] IN BLOOD BY AUTOMATED COUNT: 12.6 % (ref 11.5–14.5)
EST. GFR  (NO RACE VARIABLE): >60 ML/MIN/1.73 M^2
GLUCOSE SERPL-MCNC: 93 MG/DL (ref 70–110)
HCT VFR BLD AUTO: 49 % (ref 40–54)
HDLC SERPL-MCNC: 30 MG/DL (ref 40–75)
HDLC SERPL: 14.6 % (ref 20–50)
HGB BLD-MCNC: 16.5 G/DL (ref 14–18)
IMM GRANULOCYTES # BLD AUTO: 0.02 K/UL (ref 0–0.04)
IMM GRANULOCYTES NFR BLD AUTO: 0.2 % (ref 0–0.5)
LDLC SERPL CALC-MCNC: 117.2 MG/DL (ref 63–159)
LYMPHOCYTES # BLD AUTO: 3.4 K/UL (ref 1–4.8)
LYMPHOCYTES NFR BLD: 39.1 % (ref 18–48)
MCH RBC QN AUTO: 29.2 PG (ref 27–31)
MCHC RBC AUTO-ENTMCNC: 33.7 G/DL (ref 32–36)
MCV RBC AUTO: 87 FL (ref 82–98)
MONOCYTES # BLD AUTO: 0.6 K/UL (ref 0.3–1)
MONOCYTES NFR BLD: 6.4 % (ref 4–15)
NEUTROPHILS # BLD AUTO: 4.3 K/UL (ref 1.8–7.7)
NEUTROPHILS NFR BLD: 48.4 % (ref 38–73)
NONHDLC SERPL-MCNC: 175 MG/DL
NRBC BLD-RTO: 0 /100 WBC
PLATELET # BLD AUTO: 270 K/UL (ref 150–450)
PMV BLD AUTO: 10 FL (ref 9.2–12.9)
POTASSIUM SERPL-SCNC: 4.1 MMOL/L (ref 3.5–5.1)
PROT SERPL-MCNC: 7.2 G/DL (ref 6–8.4)
RBC # BLD AUTO: 5.65 M/UL (ref 4.6–6.2)
SODIUM SERPL-SCNC: 140 MMOL/L (ref 136–145)
TESTOST SERPL-MCNC: 293 NG/DL (ref 304–1227)
TRIGL SERPL-MCNC: 289 MG/DL (ref 30–150)
TSH SERPL DL<=0.005 MIU/L-ACNC: 3.44 UIU/ML (ref 0.4–4)
WBC # BLD AUTO: 8.77 K/UL (ref 3.9–12.7)

## 2023-07-24 PROCEDURE — 99999 PR PBB SHADOW E&M-EST. PATIENT-LVL III: CPT | Mod: PBBFAC,,, | Performed by: INTERNAL MEDICINE

## 2023-07-24 PROCEDURE — 99396 PREV VISIT EST AGE 40-64: CPT | Mod: S$GLB,,, | Performed by: INTERNAL MEDICINE

## 2023-07-24 PROCEDURE — 84153 ASSAY OF PSA TOTAL: CPT | Performed by: INTERNAL MEDICINE

## 2023-07-24 PROCEDURE — 99999 PR PBB SHADOW E&M-EST. PATIENT-LVL III: ICD-10-PCS | Mod: PBBFAC,,, | Performed by: INTERNAL MEDICINE

## 2023-07-24 PROCEDURE — 80061 LIPID PANEL: CPT | Performed by: INTERNAL MEDICINE

## 2023-07-24 PROCEDURE — 84443 ASSAY THYROID STIM HORMONE: CPT | Performed by: INTERNAL MEDICINE

## 2023-07-24 PROCEDURE — 85025 COMPLETE CBC W/AUTO DIFF WBC: CPT | Performed by: INTERNAL MEDICINE

## 2023-07-24 PROCEDURE — 36415 COLL VENOUS BLD VENIPUNCTURE: CPT | Performed by: INTERNAL MEDICINE

## 2023-07-24 PROCEDURE — 84403 ASSAY OF TOTAL TESTOSTERONE: CPT | Performed by: INTERNAL MEDICINE

## 2023-07-24 PROCEDURE — 99396 PR PREVENTIVE VISIT,EST,40-64: ICD-10-PCS | Mod: S$GLB,,, | Performed by: INTERNAL MEDICINE

## 2023-07-24 PROCEDURE — 80053 COMPREHEN METABOLIC PANEL: CPT | Performed by: INTERNAL MEDICINE

## 2023-07-24 RX ORDER — BUPROPION HYDROCHLORIDE 300 MG/1
300 TABLET ORAL DAILY
Qty: 90 TABLET | Refills: 3 | Status: SHIPPED | OUTPATIENT
Start: 2023-07-24

## 2023-07-24 RX ORDER — FLUOXETINE HYDROCHLORIDE 20 MG/1
20 CAPSULE ORAL DAILY
Qty: 90 CAPSULE | Refills: 3 | Status: SHIPPED | OUTPATIENT
Start: 2023-07-24

## 2023-07-24 NOTE — PROGRESS NOTES
[facility] Note    Chief Complaint      Chief Complaint   Patient presents with    Annual Exam       History of Present Illness      Jacinto Alexander is a 48 y.o. male with chronic conditions of hypogonadism, depression, LANEY who presents today for: annual preventative visit.  Hypogonadism: Saw Dr. Avril Elmore, endocriniology, who attributed low T to sleep apnea.  Has not followed up.  Depression: Sees Dr. Denise.  On prozac and wellbutrin.    LANEY: intolerant to CPAP.   Diet: Prepares own food mostly. Hasn't been snacking as much.  Has lost 10-20 lbs since last year.  Needs to increase water.  Exercise: Hasn't been exercising lately.  Plans to restart gym workouts.    Denies drinking and driving, drinking more than 4 drinks on occasion, drug use.     Flu shot declines.  TdAP 2019.  COVID discussed.  Shingrix discussed. Pneumonia vaccine due age 63.    Cologuard neg .     Past Medical History:  History reviewed. No pertinent past medical history.    Past Surgical History:   has a past surgical history that includes Cholecystectomy; Vasectomy; Hernia repair; and Tonsillectomy.    Family History:  family history includes Cancer in his father; Hearing loss in his father; Heart disease in his father and mother; Learning disabilities in his daughter; Lung cancer in his father; Stroke in his father.     Social History:  Social History     Tobacco Use    Smoking status: Former     Packs/day: 1.00     Years: 15.00     Pack years: 15.00     Types: Cigarettes     Quit date: 3/12/2012     Years since quittin.3    Smokeless tobacco: Never   Substance Use Topics    Alcohol use: Not Currently     Alcohol/week: 0.0 standard drinks     Comment: occasional    Drug use: Not Currently       I personally reviewed all past medical, surgical, social and family history.    Review of Systems   Constitutional:  Negative for chills, fever and malaise/fatigue.   Respiratory:  Negative for shortness of breath.   "  Cardiovascular:  Negative for chest pain.   Gastrointestinal:  Negative for constipation, diarrhea, nausea and vomiting.   Skin:  Negative for rash.   Neurological:  Negative for weakness.   All other systems reviewed and are negative.     Medications:  Outpatient Encounter Medications as of 7/24/2023   Medication Sig Dispense Refill    buPROPion (WELLBUTRIN XL) 300 MG 24 hr tablet Take 1 tablet (300 mg total) by mouth once daily. 90 tablet 3    FLUoxetine 20 MG capsule Take 1 capsule (20 mg total) by mouth once daily. 90 capsule 3    tadalafiL (CIALIS) 20 MG Tab Take 1 tablet (20 mg total) by mouth daily as needed (60 minutes prior to intercourse). (Patient not taking: Reported on 12/22/2021) 30 tablet 5    [DISCONTINUED] buPROPion (WELLBUTRIN XL) 300 MG 24 hr tablet Take 1 tablet (300 mg total) by mouth once daily. 90 tablet 0    [DISCONTINUED] FLUoxetine 20 MG capsule Take 1 capsule (20 mg total) by mouth once daily. 90 capsule 0     No facility-administered encounter medications on file as of 7/24/2023.       Allergies:  Review of patient's allergies indicates:  No Known Allergies    Health Maintenance:  Immunization History   Administered Date(s) Administered    Influenza 03/16/2020    Tdap 11/01/2019      Health Maintenance   Topic Date Due    Hepatitis C Screening  Never done    Lipid Panel  02/24/2025    TETANUS VACCINE  11/01/2029        Physical Exam      Vital Signs  Pulse: 68  SpO2: 98 %  BP: 120/86  BP Location: Left arm  Patient Position: Sitting  Pain Score: 0-No pain  Height and Weight  Height: 6' 1" (185.4 cm)  Weight: 119.9 kg (264 lb 5.3 oz)  BSA (Calculated - sq m): 2.48 sq meters  BMI (Calculated): 34.9  Weight in (lb) to have BMI = 25: 189.1]    Physical Exam  Vitals reviewed.   Constitutional:       Appearance: He is well-developed.   HENT:      Head: Normocephalic and atraumatic.      Right Ear: External ear normal.      Left Ear: External ear normal.   Cardiovascular:      Rate and Rhythm: " Normal rate and regular rhythm.      Heart sounds: Normal heart sounds. No murmur heard.  Pulmonary:      Effort: Pulmonary effort is normal.      Breath sounds: Normal breath sounds. No wheezing or rales.   Abdominal:      General: Bowel sounds are normal.      Palpations: Abdomen is soft.        Laboratory:  CBC:      CMP:        Invalid input(s): CREATININ  URINALYSIS:       LIPIDS:      TSH:      A1C:        Assessment/Plan     Jacinto Alexander is a 48 y.o.male with:    1. Annual physical exam  - CBC Auto Differential; Future  - Comprehensive Metabolic Panel; Future  - Lipid Panel; Future  - TSH; Future  - PSA, Screening; Future  - Testosterone, Total, LC/MS/MS; Future  - CBC Auto Differential  - Comprehensive Metabolic Panel  - Lipid Panel  - TSH  - PSA, Screening  - Testosterone, Total, LC/MS/MS  Discussed diet and exercise, vaccines and cancer screening, risk factors.  Screening labs ordered.     2. Hypogonadism male  - Testosterone, Total, LC/MS/MS; Future  - Testosterone, Total, LC/MS/MS  Check labs  3. Mild recurrent major depression  - FLUoxetine 20 MG capsule; Take 1 capsule (20 mg total) by mouth once daily.  Dispense: 90 capsule; Refill: 3  - buPROPion (WELLBUTRIN XL) 300 MG 24 hr tablet; Take 1 tablet (300 mg total) by mouth once daily.  Dispense: 90 tablet; Refill: 3  Continue current meds.    4. LANEY (obstructive sleep apnea)  Counseled on diet and exercise to control weight.     Chronic conditions status updated as per HPI.  Other than changes above, cont current medications and maintain follow up with specialists.  Follow up in about 1 year (around 7/24/2024) for Annual preventative visit.    No future appointments.    Ari Alanis MD  [facility]

## 2023-08-02 NOTE — PROGRESS NOTES
Labs look good.  Testosterone slightly low, but not at point where we generally recommend taking testosterone supplements.

## 2024-03-03 NOTE — PROGRESS NOTES
Two patient identifiers used, allergies reviewed, vaccine confirmed.  Tdap vaccine administered IM right deltoid.  Pt tolerated well, no redness or bruising at injection site.  Pt advised to remain in clinic 15 mins following injection for observation.   No

## 2024-08-19 DIAGNOSIS — F33.0 MILD RECURRENT MAJOR DEPRESSION: ICD-10-CM

## 2024-08-19 RX ORDER — BUPROPION HYDROCHLORIDE 300 MG/1
300 TABLET ORAL
Qty: 90 TABLET | Refills: 0 | Status: SHIPPED | OUTPATIENT
Start: 2024-08-19

## 2024-08-19 NOTE — TELEPHONE ENCOUNTER
Refill Decision Note   Jacinto Alexander  is requesting a refill authorization.  Brief Assessment and Rationale for Refill:  Approve     Medication Therapy Plan:       Medication Reconciliation Completed: No   Comments:     No Care Gaps recommended.     Note composed:11:59 AM 08/19/2024

## 2024-08-19 NOTE — TELEPHONE ENCOUNTER
No care due was identified.  [facility] Embedded Care Due Messages. Reference number: [id number].   8/19/2024 5:07:48 AM CDT

## 2024-11-18 DIAGNOSIS — F33.0 MILD RECURRENT MAJOR DEPRESSION: ICD-10-CM

## 2024-11-18 RX ORDER — FLUOXETINE HYDROCHLORIDE 20 MG/1
20 CAPSULE ORAL
Qty: 90 CAPSULE | Refills: 1 | Status: SHIPPED | OUTPATIENT
Start: 2024-11-18

## 2024-11-18 RX ORDER — BUPROPION HYDROCHLORIDE 300 MG/1
300 TABLET ORAL
Qty: 90 TABLET | Refills: 1 | Status: SHIPPED | OUTPATIENT
Start: 2024-11-18

## 2024-11-18 NOTE — TELEPHONE ENCOUNTER
Care Due:                  Date            Visit Type   Department     Provider  --------------------------------------------------------------------------------                                MYCHART                              FOLLOWUP/OF  [facility]  Last Visit: 07-      [facility]           Ari Donovan  Next Visit: None Scheduled  None         None Found                                                            Last  Test          Frequency    Reason                     Performed    Due Date  --------------------------------------------------------------------------------    Office Visit  15 months..  FLUoxetine, buPROPion....  07-   10-    [facility] Embedded Care Due Messages. Reference number: [id number].   11/18/2024 3:37:39 AM CST

## 2025-04-02 ENCOUNTER — OFFICE VISIT (OUTPATIENT)
Dept: PRIMARY CARE CLINIC | Facility: CLINIC | Age: 50
End: 2025-04-02
Payer: COMMERCIAL

## 2025-04-02 ENCOUNTER — LAB VISIT (OUTPATIENT)
Dept: LAB | Facility: HOSPITAL | Age: 50
End: 2025-04-02
Attending: INTERNAL MEDICINE
Payer: COMMERCIAL

## 2025-04-02 VITALS
OXYGEN SATURATION: 97 % | BODY MASS INDEX: 36.35 KG/M2 | DIASTOLIC BLOOD PRESSURE: 84 MMHG | HEIGHT: 73 IN | WEIGHT: 274.25 LBS | HEART RATE: 85 BPM | SYSTOLIC BLOOD PRESSURE: 120 MMHG

## 2025-04-02 DIAGNOSIS — F33.0 MILD RECURRENT MAJOR DEPRESSION: ICD-10-CM

## 2025-04-02 DIAGNOSIS — Z12.12 ENCOUNTER FOR COLORECTAL CANCER SCREENING: ICD-10-CM

## 2025-04-02 DIAGNOSIS — E29.1 HYPOGONADISM MALE: ICD-10-CM

## 2025-04-02 DIAGNOSIS — Z12.11 ENCOUNTER FOR COLORECTAL CANCER SCREENING: ICD-10-CM

## 2025-04-02 DIAGNOSIS — G47.33 OSA (OBSTRUCTIVE SLEEP APNEA): ICD-10-CM

## 2025-04-02 DIAGNOSIS — Z00.00 ANNUAL PHYSICAL EXAM: Primary | ICD-10-CM

## 2025-04-02 DIAGNOSIS — Z00.00 ANNUAL PHYSICAL EXAM: ICD-10-CM

## 2025-04-02 LAB
ABSOLUTE EOSINOPHIL (OHS): 0.37 K/UL
ABSOLUTE MONOCYTE (OHS): 0.53 K/UL (ref 0.3–1)
ABSOLUTE NEUTROPHIL COUNT (OHS): 3.6 K/UL (ref 1.8–7.7)
ALBUMIN SERPL BCP-MCNC: 3.9 G/DL (ref 3.5–5.2)
ALP SERPL-CCNC: 73 UNIT/L (ref 40–150)
ALT SERPL W/O P-5'-P-CCNC: 20 UNIT/L (ref 10–44)
ANION GAP (OHS): 10 MMOL/L (ref 8–16)
AST SERPL-CCNC: 19 UNIT/L (ref 11–45)
BASOPHILS # BLD AUTO: 0.11 K/UL
BASOPHILS NFR BLD AUTO: 1.4 %
BILIRUB SERPL-MCNC: 0.6 MG/DL (ref 0.1–1)
BUN SERPL-MCNC: 13 MG/DL (ref 6–20)
CALCIUM SERPL-MCNC: 9.5 MG/DL (ref 8.7–10.5)
CHLORIDE SERPL-SCNC: 105 MMOL/L (ref 95–110)
CHOLEST SERPL-MCNC: 200 MG/DL (ref 120–199)
CHOLEST/HDLC SERPL: 6.3 {RATIO} (ref 2–5)
CO2 SERPL-SCNC: 27 MMOL/L (ref 23–29)
CREAT SERPL-MCNC: 1.3 MG/DL (ref 0.5–1.4)
ERYTHROCYTE [DISTWIDTH] IN BLOOD BY AUTOMATED COUNT: 12.5 % (ref 11.5–14.5)
GFR SERPLBLD CREATININE-BSD FMLA CKD-EPI: >60 ML/MIN/1.73/M2
GLUCOSE SERPL-MCNC: 84 MG/DL (ref 70–110)
HCT VFR BLD AUTO: 48.3 % (ref 40–54)
HDLC SERPL-MCNC: 32 MG/DL (ref 40–75)
HDLC SERPL: 16 % (ref 20–50)
HGB BLD-MCNC: 16.2 GM/DL (ref 14–18)
IMM GRANULOCYTES # BLD AUTO: 0.03 K/UL (ref 0–0.04)
IMM GRANULOCYTES NFR BLD AUTO: 0.4 % (ref 0–0.5)
LDLC SERPL CALC-MCNC: 124.8 MG/DL (ref 63–159)
LYMPHOCYTES # BLD AUTO: 3.24 K/UL (ref 1–4.8)
MCH RBC QN AUTO: 29.6 PG (ref 27–31)
MCHC RBC AUTO-ENTMCNC: 33.5 G/DL (ref 32–36)
MCV RBC AUTO: 88 FL (ref 82–98)
NONHDLC SERPL-MCNC: 168 MG/DL
NUCLEATED RBC (/100WBC) (OHS): 0 /100 WBC
PLATELET # BLD AUTO: 223 K/UL (ref 150–450)
PMV BLD AUTO: 10 FL (ref 9.2–12.9)
POTASSIUM SERPL-SCNC: 4.8 MMOL/L (ref 3.5–5.1)
PROT SERPL-MCNC: 7.2 GM/DL (ref 6–8.4)
PSA SERPL-MCNC: 0.55 NG/ML
RBC # BLD AUTO: 5.47 M/UL (ref 4.6–6.2)
RELATIVE EOSINOPHIL (OHS): 4.7 %
RELATIVE LYMPHOCYTE (OHS): 41.1 % (ref 18–48)
RELATIVE MONOCYTE (OHS): 6.7 % (ref 4–15)
RELATIVE NEUTROPHIL (OHS): 45.7 % (ref 38–73)
SODIUM SERPL-SCNC: 142 MMOL/L (ref 136–145)
TESTOST SERPL-MCNC: 355 NG/DL (ref 304–1227)
TRIGL SERPL-MCNC: 216 MG/DL (ref 30–150)
TSH SERPL-ACNC: 3.34 UIU/ML (ref 0.4–4)
WBC # BLD AUTO: 7.88 K/UL (ref 3.9–12.7)

## 2025-04-02 PROCEDURE — 84153 ASSAY OF PSA TOTAL: CPT

## 2025-04-02 PROCEDURE — 80061 LIPID PANEL: CPT

## 2025-04-02 PROCEDURE — 3079F DIAST BP 80-89 MM HG: CPT | Mod: CPTII,S$GLB,, | Performed by: INTERNAL MEDICINE

## 2025-04-02 PROCEDURE — 36415 COLL VENOUS BLD VENIPUNCTURE: CPT

## 2025-04-02 PROCEDURE — 80053 COMPREHEN METABOLIC PANEL: CPT

## 2025-04-02 PROCEDURE — 84443 ASSAY THYROID STIM HORMONE: CPT

## 2025-04-02 PROCEDURE — 99396 PREV VISIT EST AGE 40-64: CPT | Mod: S$GLB,,, | Performed by: INTERNAL MEDICINE

## 2025-04-02 PROCEDURE — 3074F SYST BP LT 130 MM HG: CPT | Mod: CPTII,S$GLB,, | Performed by: INTERNAL MEDICINE

## 2025-04-02 PROCEDURE — 99999 PR PBB SHADOW E&M-EST. PATIENT-LVL IV: CPT | Mod: PBBFAC,,, | Performed by: INTERNAL MEDICINE

## 2025-04-02 PROCEDURE — 84403 ASSAY OF TOTAL TESTOSTERONE: CPT

## 2025-04-02 PROCEDURE — 1159F MED LIST DOCD IN RCRD: CPT | Mod: CPTII,S$GLB,, | Performed by: INTERNAL MEDICINE

## 2025-04-02 PROCEDURE — 85025 COMPLETE CBC W/AUTO DIFF WBC: CPT

## 2025-04-02 PROCEDURE — 3008F BODY MASS INDEX DOCD: CPT | Mod: CPTII,S$GLB,, | Performed by: INTERNAL MEDICINE

## 2025-04-02 NOTE — PROGRESS NOTES
[facility] Note    Chief Complaint      Chief Complaint   Patient presents with    Annual Exam       History of Present Illness      History of Present Illness    CHIEF COMPLAINT:  Mr. Alexander presents today for follow up regarding recent weight gain.    WEIGHT MANAGEMENT:  He reports recent weight gain which he attributes to a sedentary lifestyle, insufficient exercise, and stress eating with sweet cravings. He has made dietary modifications including significantly increasing water intake with flavor enhancers and reducing soda consumption by at least 50%.    SLEEP:  Since discontinuing medications in January, he experiences difficulty falling asleep with sensations of skin crawling upon getting into bed. Once asleep, he reports sleeping well with only occasional awakenings.    ENDOCRINE:  He reports history of low testosterone levels. Previously tried testosterone paste but discontinued due to interference with daily activities. Last testosterone treatment was in 2023.    MEDICATIONS AND SUPPLEMENTS:  He discontinued all medications in January by personal choice, noting they had helped him develop stress coping mechanisms. Current supplements include magnesium, vitamin D, vitamin E, and phosphate-based fish oil (reduced from three to two pills daily).      ROS:  General: +weight gain  Gastrointestinal: +unusual cravings  Skin: +restless legs  Neurological: +difficulty falling asleep, +sleep disturbances       Hypogonadism: Previously saw Dr. Avril Elmore, endocriniology, who attributed low T to sleep apnea.  Has tried topical testosterone but did not tolerate.    Depression: Previously saw Dr. Denise.  Off prozac and wellbutrin.  Doing well without except having difficulty falling asleep.  LANEY: intolerant to CPAP.  Diet: Prepares own food mostly. Reports stress eating, craving sweets.  Drinks plenty water.  Exercise: Needs to increase.  Reports a sedentary lifestyle.     Denies drinking and  driving, drinking more than 4 drinks on occasion, drug use.     Flu shot declines.  TdAP 2019.  COVID discussed.  Shingrix discussed. Pneumonia vaccine due age 63.    Cologuard neg 2022.     Assessment/Plan     Jacinto Alexander is a 50 y.o.male with:    Assessment & Plan    Assessed recent weight gain and medication cessation.  Considered low testosterone as potential contributor to weight gain.  Evaluated sleep issues and considered potential causes and solutions.  Discussed supplement regimen and its possible relation to GI symptoms.  Considered fiber intake as a factor in GI symptoms.  Explained the role of testosterone replacement therapy in managing symptoms and potential weight gain.    TESTICULAR HYPOFUNCTION:  - Ordered labs to confirm testosterone levels before proceeding with treatment.  - Referred the patient to urology/andrology department for specialized management of low testosterone.  - Acknowledged the patient's history of low testosterone levels and its potential connection to weight gain.  - Discussed potential creative testosterone delivery methods with the patient.  - Advised that addressing low testosterone might help with weight management and overall symptoms.    ABNORMAL WEIGHT GAIN:  - Evaluated the patient's report of sudden recent weight gain.  - Discussed potential causes of weight gain with the patient.  - Inquired about the patient's exercise habits and water intake.  - Suggested that addressing low testosterone might help with weight management.  - Recommend increasing exercise to address sedentary lifestyle.    SLEEP DISORDERS:  - Evaluated the patient's sleep pattern, noting difficulty falling asleep but generally staying asleep once asleep.  - Suggested that addressing testosterone levels might improve sleep.  - Recommend trying OTC sleep aids such as melatonin, Zzzquil, or Unisom for difficulty falling asleep.  - Noted that the patient reports difficulty falling asleep after  discontinuing medications.    PARESTHESIA:  - Evaluated the patient's report of skin crawling sensation when trying to sleep.  - Noted that the patient describes the sensation as annoying and interfering with sleep.  - Suggested that addressing testosterone levels might help with overall symptoms, including the paresthesia.    GASTROINTESTINAL HEALTH:  - Discussed the relationship between hydration, fiber intake, and bowel health.  -  May to continue current water intake habits and reduced soda consumption.  - Recommend increasing fiber intake through diet or supplements like Metamucil or Benefiber.  - Recommend systematic elimination of supplements one by one to identify potential culprit for foul-smelling gas.         1. Annual physical exam  - CBC Auto Differential; Future  - Comprehensive Metabolic Panel; Future  - Lipid Panel; Future  - TSH; Future  - PSA, Screening; Future  - Cologuard Screening (Multitarget Stool DNA); Future  - Cologuard Screening (Multitarget Stool DNA)  - Testosterone; Future    2. Hypogonadism male  - Testosterone; Future  - Ambulatory referral/consult to Urology; Future    3. Mild recurrent major depression    4. LANEY (obstructive sleep apnea)    5. Encounter for colorectal cancer screening  - Cologuard Screening (Multitarget Stool DNA); Future  - Cologuard Screening (Multitarget Stool DNA)      Chronic conditions status updated as per HPI.  Other than changes above, cont current medications and maintain follow up with specialists.  Follow up in about 1 year (around 4/2/2026) for Annual preventative visit.    Future Appointments   Date Time Provider Department Center   4/7/2025  8:00 AM See Woo MD [facility] UROLOGY Amari Gaona           Past Medical History:  History reviewed. No pertinent past medical history.    Past Surgical History:   has a past surgical history that includes Cholecystectomy; Vasectomy; Hernia repair; and Tonsillectomy.    Family History:  family history includes  "Cancer in his father; Hearing loss in his father; Heart disease in his father and mother; Learning disabilities in his daughter and daughter; Lung cancer in his father; Stroke in his father.     Social History:  Social History[1]    Medications:  Encounter Medications[2]    Allergies:  Review of patient's allergies indicates:  No Known Allergies    Health Maintenance:  Immunization History   Administered Date(s) Administered    Influenza 03/16/2020    Tdap 11/01/2019      Health Maintenance   Topic Date Due    Hepatitis C Screening  Never done    HIV Screening  Never done    Shingles Vaccine (1 of 2) Never done    Pneumococcal Vaccines (Age 50+) (1 of 1 - PCV) Never done    Hemoglobin A1c (Diabetic Prevention Screening)  03/10/2023    Influenza Vaccine (1) 09/01/2024    COVID-19 Vaccine (1 - 2024-25 season) Never done    Colorectal Cancer Screening  01/23/2025    Lipid Panel  07/24/2028    TETANUS VACCINE  11/01/2029    RSV Vaccine (Age 60+ and Pregnant patients) (1 - 1-dose 75+ series) 02/12/2048        Physical Exam      Vital Signs  Pulse: 85  SpO2: 97 %  BP: 120/84  BP Location: Right arm  Patient Position: Sitting  Pain Score: 0-No pain  Height and Weight  Height: 6' 1" (185.4 cm)  Weight: 124.4 kg (274 lb 4 oz)  BSA (Calculated - sq m): 2.53 sq meters  BMI (Calculated): 36.2  Weight in (lb) to have BMI = 25: 189.1]    Physical Exam    General: No acute distress. Well-developed. Well-nourished.  Eyes: EOMI. Sclerae anicteric.  HENT: Normocephalic. Atraumatic. Nares patent. Moist oral mucosa.  Ears: Bilateral TMs clear. Bilateral EACs clear.  Cardiovascular: Regular rate. Regular rhythm. No murmurs. No rubs. No gallops. Normal S1, S2.  Respiratory: Normal respiratory effort. Clear to auscultation bilaterally. No rales. No rhonchi. No wheezing.  Abdomen: Soft. Non-tender. Non-distended. Normoactive bowel sounds.  Musculoskeletal: No  obvious deformity.  Extremities: No lower extremity edema.  Neurological: Alert " & oriented x3. No slurred speech. Normal gait.  Psychiatric: Normal mood. Normal affect. Good insight. Good judgment.  Skin: Warm. Dry. No rash.         Physical Exam  Vitals reviewed.   Constitutional:       Appearance: He is well-developed.   HENT:      Head: Normocephalic and atraumatic.      Right Ear: External ear normal.      Left Ear: External ear normal.   Cardiovascular:      Rate and Rhythm: Normal rate and regular rhythm.      Heart sounds: Normal heart sounds. No murmur heard.  Pulmonary:      Effort: Pulmonary effort is normal.      Breath sounds: Normal breath sounds. No wheezing or rales.   Abdominal:      General: Bowel sounds are normal.      Palpations: Abdomen is soft.         Laboratory:    Results              CBC:  Recent Labs   Lab 07/24/23  0825 04/02/25  0937   WBC 8.77 7.88   RBC 5.65 5.47   Hemoglobin 16.5  --    HGB  --  16.2   Hematocrit 49.0  --    HCT  --  48.3   Platelet Count  --  223   Platelets 270  --    MCV 87 88   MCH 29.2 29.6   MCHC 33.7 33.5     CMP:  Recent Labs   Lab 07/24/23  0825   Glucose 93   Calcium 9.2   Albumin 4.0   Total Protein 7.2   Sodium 140   Potassium 4.1   CO2 26   Chloride 107   BUN 12   Alkaline Phosphatase 87   ALT 19   AST 17   Total Bilirubin 0.8     URINALYSIS:       LIPIDS:  Recent Labs   Lab 07/24/23  0825   TSH 3.439   HDL 30 L   Cholesterol 205 H   Triglycerides 289 H   LDL Cholesterol 117.2   HDL/Cholesterol Ratio 14.6 L   Non-HDL Cholesterol 175   Total Cholesterol/HDL Ratio 6.8 H     TSH:  Recent Labs   Lab 07/24/23  0825   TSH 3.439     A1C:            This note was generated with the assistance of ambient listening technology. Verbal consent was obtained by the patient and accompanying visitor(s) for the recording of patient appointment to facilitate this note. I attest to having reviewed and edited the generated note for accuracy, though some syntax or spelling errors may persist. Please contact the author of this note for any  clarification.      Ari Alanis MD  [facility]                       [1]   Social History  Tobacco Use    Smoking status: Former     Current packs/day: 0.00     Average packs/day: 1 pack/day for 15.0 years (15.0 ttl pk-yrs)     Types: Cigarettes     Start date: 3/12/1997     Quit date: 3/12/2012     Years since quittin.0    Smokeless tobacco: Never   Substance Use Topics    Alcohol use: Yes     Comment: occasional    Drug use: Not Currently   [2]   Outpatient Encounter Medications as of 2025   Medication Sig Dispense Refill    buPROPion (WELLBUTRIN XL) 300 MG 24 hr tablet TAKE 1 TABLET(300 MG) BY MOUTH EVERY DAY 90 tablet 1    FLUoxetine 20 MG capsule TAKE 1 CAPSULE(20 MG) BY MOUTH EVERY DAY 90 capsule 1    tadalafiL (CIALIS) 20 MG Tab Take 1 tablet (20 mg total) by mouth daily as needed (60 minutes prior to intercourse). (Patient not taking: Reported on 2021) 30 tablet 5     No facility-administered encounter medications on file as of 2025.

## 2025-04-03 ENCOUNTER — RESULTS FOLLOW-UP (OUTPATIENT)
Dept: PRIMARY CARE CLINIC | Facility: CLINIC | Age: 50
End: 2025-04-03

## 2025-04-07 ENCOUNTER — OFFICE VISIT (OUTPATIENT)
Dept: UROLOGY | Facility: CLINIC | Age: 50
End: 2025-04-07
Payer: COMMERCIAL

## 2025-04-07 VITALS
DIASTOLIC BLOOD PRESSURE: 82 MMHG | HEART RATE: 71 BPM | WEIGHT: 273.38 LBS | BODY MASS INDEX: 36.23 KG/M2 | SYSTOLIC BLOOD PRESSURE: 116 MMHG | HEIGHT: 73 IN

## 2025-04-07 DIAGNOSIS — R63.5 WEIGHT GAIN: Primary | ICD-10-CM

## 2025-04-07 DIAGNOSIS — N13.8 BPH WITH URINARY OBSTRUCTION: ICD-10-CM

## 2025-04-07 DIAGNOSIS — N40.1 BPH WITH URINARY OBSTRUCTION: ICD-10-CM

## 2025-04-07 PROBLEM — E29.1 HYPOGONADISM MALE: Status: RESOLVED | Noted: 2019-11-01 | Resolved: 2025-04-07

## 2025-04-07 PROCEDURE — 1160F RVW MEDS BY RX/DR IN RCRD: CPT | Mod: CPTII,S$GLB,, | Performed by: UROLOGY

## 2025-04-07 PROCEDURE — 3008F BODY MASS INDEX DOCD: CPT | Mod: CPTII,S$GLB,, | Performed by: UROLOGY

## 2025-04-07 PROCEDURE — 3074F SYST BP LT 130 MM HG: CPT | Mod: CPTII,S$GLB,, | Performed by: UROLOGY

## 2025-04-07 PROCEDURE — 99203 OFFICE O/P NEW LOW 30 MIN: CPT | Mod: S$GLB,,, | Performed by: UROLOGY

## 2025-04-07 PROCEDURE — 99999 PR PBB SHADOW E&M-EST. PATIENT-LVL III: CPT | Mod: PBBFAC,,, | Performed by: UROLOGY

## 2025-04-07 PROCEDURE — 1159F MED LIST DOCD IN RCRD: CPT | Mod: CPTII,S$GLB,, | Performed by: UROLOGY

## 2025-04-07 PROCEDURE — 3079F DIAST BP 80-89 MM HG: CPT | Mod: CPTII,S$GLB,, | Performed by: UROLOGY

## 2025-04-07 NOTE — LETTER
April 7, 2025        Ari Alanis MD  [address]  [address]  [place] [address]             [facility]  [address]  [facility] [address]  Phone: [phone]   Patient: Jacinto Alexander   MR Number: [id number]   YOB: 1975   Date of Visit: 4/7/2025       Dear Dr. Alanis:    Thank you for referring Jacinto Alexander to me for evaluation. Attached you will find relevant portions of my assessment and plan of care.    If you have questions, please do not hesitate to call me. I look forward to following Jacinto Alexander along with you.    Sincerely,      See Woo MD            CC  No Recipients    Enclosure

## 2025-04-07 NOTE — PROGRESS NOTES
CHIEF COMPLAINT:    Mr. Alexander is a 50 y.o. male presenting for a consultation at the request of Dr. Alanis. Patient presents with weight gin.    PRESENTING ILLNESS:    Jacinto Alexander is a 50 y.o. male who was referred for low T.  However, his T was normal.  Reports weight gain and a history of hypogonadism in the past as the reason the T was drawn.    He has LUTS.  Nocturia x 1.  Is pleased with how he voids.    He denies ED.    REVIEW OF SYSTEMS:    Jacinto Alexander denies headache, blurred vision, fever, nausea, vomiting, chills, abdominal pain, chest pain, sore throat, bleeding per rectum, cough, SOB, recent loss of consciousness, recent mental status changes, seizures, dizziness, or upper or lower extremity weakness.    MICHEAL  1. 3  2. 5  3. 5  4. 5  5. 5      PATIENT HISTORY:    History reviewed. No pertinent past medical history.    Past Surgical History:   Procedure Laterality Date    CHOLECYSTECTOMY      HERNIA REPAIR      TONSILLECTOMY      VASECTOMY         Family History   Problem Relation Name Age of Onset    Heart disease Mother Gayle May     Heart disease Father Herman May     Lung cancer Father Herman May     Stroke Father Herman May     Cancer Father Herman May     Hearing loss Father Herman May     Learning disabilities Daughter Violetta May         Autism    Learning disabilities Daughter Violetta May         Autism       Social History[1]    Allergies:  Patient has no known allergies.    Medications:  Current Medications[2]    PHYSICAL EXAMINATION:    The patient generally appears in good health, is appropriately interactive, and is in no apparent distress.     Eyes: anicteric sclerae, moist conjunctivae; no lid-lag; PERRLA     HENT: Atraumatic; oropharynx clear with moist mucous membranes and no mucosal ulcerations;normal hard and soft palate.  No evidence of lymphadenopathy.    Neck: Trachea midline.  No thyromegaly.    Skin: No lesions.    Mental: Cooperative with normal affect.  Is oriented to time, place, and  person.    Neuro: Grossly intact.    Chest: Normal inspiratory effort.   No accessory muscles.  No audible wheezes.  Respirations symmetric on inspiration and expiration.    Heart: Regular rhythm.      Abdomen:  Soft, non-tender. No masses or organomegaly. Bladder is not palpable. No evidence of flank discomfort. No evidence of inguinal hernia.    Extremities: No clubbing, cyanosis, or edema      LABS:      Lab Results   Component Value Date    PSA 0.55 2025    PSA 0.58 2023    PSADIAG 0.5 2020       IMPRESSION:    Encounter Diagnoses   Name Primary?    Weight gain Yes    BPH with urinary obstruction          PLAN:    1. Discussed that his T is normal.  He does not have hypogonadism.  2. Will observe his LUTS as they don't bother him.  3. RTC prn    Copy to: Zeke           [1]   Social History  Socioeconomic History    Marital status:    Tobacco Use    Smoking status: Former     Current packs/day: 0.00     Average packs/day: 1 pack/day for 15.0 years (15.0 ttl pk-yrs)     Types: Cigarettes     Start date: 3/12/1997     Quit date: 3/12/2012     Years since quittin.0    Smokeless tobacco: Never   Substance and Sexual Activity    Alcohol use: Yes     Comment: occasional    Drug use: Not Currently    Sexual activity: Yes     Partners: Female     Birth control/protection: Partner-Vasectomy     Social Drivers of Health     Financial Resource Strain: Low Risk  (4/3/2025)    Overall Financial Resource Strain (CARDIA)     Difficulty of Paying Living Expenses: Not hard at all   Food Insecurity: No Food Insecurity (4/3/2025)    Hunger Vital Sign     Worried About Running Out of Food in the Last Year: Never true     Ran Out of Food in the Last Year: Never true   Transportation Needs: No Transportation Needs (4/3/2025)    PRAPARE - Transportation     Lack of Transportation (Medical): No     Lack of Transportation (Non-Medical): No   Physical Activity: Insufficiently Active (4/3/2025)    Exercise  Vital Sign     Days of Exercise per Week: 2 days     Minutes of Exercise per Session: 40 min   Stress: Stress Concern Present (4/3/2025)    [employer] - Occupational Stress Questionnaire     Feeling of Stress : Rather much   Housing Stability: Low Risk  (4/3/2025)    Housing Stability Vital Sign     Unable to Pay for Housing in the Last Year: No     Number of Times Moved in the Last Year: 0     Homeless in the Last Year: No   [2]   Current Outpatient Medications:     buPROPion (WELLBUTRIN XL) 300 MG 24 hr tablet, TAKE 1 TABLET(300 MG) BY MOUTH EVERY DAY, Disp: 90 tablet, Rfl: 1    FLUoxetine 20 MG capsule, TAKE 1 CAPSULE(20 MG) BY MOUTH EVERY DAY, Disp: 90 capsule, Rfl: 1    tadalafiL (CIALIS) 20 MG Tab, Take 1 tablet (20 mg total) by mouth daily as needed (60 minutes prior to intercourse). (Patient not taking: Reported on 12/22/2021), Disp: 30 tablet, Rfl: 5